# Patient Record
Sex: MALE | Race: BLACK OR AFRICAN AMERICAN | NOT HISPANIC OR LATINO | Employment: FULL TIME | URBAN - METROPOLITAN AREA
[De-identification: names, ages, dates, MRNs, and addresses within clinical notes are randomized per-mention and may not be internally consistent; named-entity substitution may affect disease eponyms.]

---

## 2019-02-13 ENCOUNTER — HOSPITAL ENCOUNTER (EMERGENCY)
Facility: HOSPITAL | Age: 39
Discharge: HOME | End: 2019-02-13
Attending: EMERGENCY MEDICINE
Payer: COMMERCIAL

## 2019-02-13 VITALS
TEMPERATURE: 98 F | SYSTOLIC BLOOD PRESSURE: 138 MMHG | WEIGHT: 290 LBS | RESPIRATION RATE: 18 BRPM | HEART RATE: 91 BPM | OXYGEN SATURATION: 100 % | DIASTOLIC BLOOD PRESSURE: 94 MMHG | HEIGHT: 78 IN | BODY MASS INDEX: 33.55 KG/M2

## 2019-02-13 DIAGNOSIS — Z02.89 ENCOUNTER TO OBTAIN EXCUSE FROM WORK: Primary | ICD-10-CM

## 2019-02-13 DIAGNOSIS — H57.89 IRRITATION OF RIGHT EYE: ICD-10-CM

## 2019-02-13 PROCEDURE — 99281 EMR DPT VST MAYX REQ PHY/QHP: CPT

## 2019-02-13 NOTE — ED PROVIDER NOTES
"Encounter Date: 2/13/2019       History     Chief Complaint   Patient presents with    Eye Problem     37yo male presents to ED for a work excuse to return to work stating "no pink eye." Reports he was seen yesterday at urgent care for right eye irritation.            Review of patient's allergies indicates:  No Known Allergies  History reviewed. No pertinent past medical history.  No past surgical history on file.  History reviewed. No pertinent family history.  Social History     Tobacco Use    Smoking status: Not on file   Substance Use Topics    Alcohol use: Not on file    Drug use: Not on file     Review of Systems   Constitutional: Negative for chills, diaphoresis, fatigue and fever.   HENT: Negative for congestion, ear pain, rhinorrhea, sinus pressure, sinus pain, sneezing, sore throat and tinnitus.    Eyes: Positive for redness. Negative for photophobia, pain, discharge, itching and visual disturbance.   Respiratory: Negative for cough, choking, chest tightness, shortness of breath, wheezing and stridor.    Cardiovascular: Negative for chest pain, palpitations and leg swelling.   Gastrointestinal: Negative for abdominal pain, constipation, diarrhea, nausea and vomiting.   Genitourinary: Negative for decreased urine volume, difficulty urinating, dysuria, flank pain, frequency and urgency.   Musculoskeletal: Negative for arthralgias, back pain, gait problem, joint swelling, myalgias, neck pain and neck stiffness.   Skin: Negative for color change, pallor, rash and wound.   Neurological: Negative for dizziness, seizures, syncope, weakness, light-headedness, numbness and headaches.   Hematological: Negative for adenopathy. Does not bruise/bleed easily.   All other systems reviewed and are negative.      Physical Exam     Initial Vitals [02/13/19 0910]   BP Pulse Resp Temp SpO2   (!) 138/94 91 18 98.4 °F (36.9 °C) 100 %      MAP       --         Physical Exam    Nursing note and vitals " reviewed.  Constitutional: He appears well-developed and well-nourished. He is not diaphoretic. No distress.   HENT:   Head: Normocephalic and atraumatic.   Right Ear: External ear normal.   Left Ear: External ear normal.   Nose: Nose normal.   Mouth/Throat: Oropharynx is clear and moist.   Eyes: Conjunctivae and EOM are normal. Pupils are equal, round, and reactive to light. Right eye exhibits no discharge. Left eye exhibits no discharge. No scleral icterus.   Neck: Normal range of motion. Neck supple.   Cardiovascular: Normal rate, regular rhythm, normal heart sounds and intact distal pulses.   No murmur heard.  Pulmonary/Chest: Breath sounds normal. No respiratory distress. He has no wheezes. He has no rhonchi. He exhibits no tenderness.   Abdominal: Soft. Bowel sounds are normal. He exhibits no distension. There is no tenderness.   Musculoskeletal: Normal range of motion. He exhibits no edema or tenderness.   Lymphadenopathy:     He has no cervical adenopathy.   Neurological: He is alert and oriented to person, place, and time. GCS score is 15. GCS eye subscore is 4. GCS verbal subscore is 5. GCS motor subscore is 6.   Skin: Skin is warm. Capillary refill takes less than 2 seconds. No rash noted. No erythema.   Psychiatric: He has a normal mood and affect. His behavior is normal. Judgment and thought content normal.         ED Course   Procedures  Labs Reviewed - No data to display       Imaging Results    None          Medical Decision Making:   Differential Diagnosis:   Encounter for work clearance                      Clinical Impression:   The primary encounter diagnosis was Encounter to obtain excuse from work. A diagnosis of Irritation of right eye was also pertinent to this visit.      Disposition:   Disposition: Discharged  Condition: Stable                        Jacqueline Mcduffie MD  02/13/19 6097

## 2019-02-13 NOTE — ED NOTES
Pt here for work excuse d/t eye redness yesterday. Pt states he was told he needed an excuse to return to work. No redness noted to eye. Denies drainage. No distress noted. Pt AAO

## 2020-01-11 ENCOUNTER — HOSPITAL ENCOUNTER (EMERGENCY)
Facility: HOSPITAL | Age: 40
Discharge: HOME | End: 2020-01-11
Attending: FAMILY MEDICINE

## 2020-01-11 VITALS
HEART RATE: 76 BPM | HEIGHT: 78 IN | DIASTOLIC BLOOD PRESSURE: 88 MMHG | RESPIRATION RATE: 16 BRPM | TEMPERATURE: 98 F | SYSTOLIC BLOOD PRESSURE: 126 MMHG | WEIGHT: 314 LBS | BODY MASS INDEX: 36.33 KG/M2 | OXYGEN SATURATION: 100 %

## 2020-01-11 DIAGNOSIS — H11.421 CHEMOSIS OF CONJUNCTIVA, RIGHT: Primary | ICD-10-CM

## 2020-01-11 PROCEDURE — 25000003 PHARM REV CODE 250: Performed by: PHYSICIAN ASSISTANT

## 2020-01-11 PROCEDURE — 99283 EMERGENCY DEPT VISIT LOW MDM: CPT

## 2020-01-11 RX ORDER — TETRACAINE HYDROCHLORIDE 5 MG/ML
2 SOLUTION OPHTHALMIC
Status: DISCONTINUED | OUTPATIENT
Start: 2020-01-11 | End: 2020-01-11

## 2020-01-11 RX ORDER — TETRACAINE HYDROCHLORIDE 5 MG/ML
2 SOLUTION OPHTHALMIC ONCE
Status: COMPLETED | OUTPATIENT
Start: 2020-01-11 | End: 2020-01-11

## 2020-01-11 RX ADMIN — TETRACAINE HYDROCHLORIDE 2 DROP: 5 SOLUTION OPHTHALMIC at 02:01

## 2020-01-11 RX ADMIN — FLUORESCEIN SODIUM 1 EACH: 1 STRIP OPHTHALMIC at 02:01

## 2020-01-11 NOTE — ED PROVIDER NOTES
Encounter Date: 1/11/2020       History     Chief Complaint   Patient presents with    Eye Pain     Patient presents to the ER with complaint of swelling to right eye.  Patient states felt like there is a foreign object in his eye was rubbing it and applied clear eye patient stated after applying clear I noticed swelling to the white part of his right eye.  Patient states has mild pain feels scratchy nonradiating nothing makes it better nothing makes it worse denies ever having this problem in the past.  Patient denies any change in vision nausea vomiting diarrhea runny nose or stuffy nose. Patient denied other associated symptoms.    The history is provided by the patient.     Review of patient's allergies indicates:  No Known Allergies  History reviewed. No pertinent past medical history.  History reviewed. No pertinent surgical history.  History reviewed. No pertinent family history.  Social History     Tobacco Use    Smoking status: Never Smoker   Substance Use Topics    Alcohol use: Not Currently    Drug use: Never     Review of Systems   Constitutional: Negative for fever.   HENT: Negative for congestion, ear discharge, ear pain, facial swelling, postnasal drip, rhinorrhea and sore throat.    Eyes: Positive for pain (Right) and redness ( right eye). Negative for photophobia ( ), discharge, itching and visual disturbance.   Respiratory: Negative for shortness of breath.    Cardiovascular: Negative for chest pain.   Gastrointestinal: Negative for nausea.   Genitourinary: Negative for dysuria.   Musculoskeletal: Negative for back pain.   Skin: Negative for rash.   Neurological: Negative for weakness.   Hematological: Does not bruise/bleed easily.   All other systems reviewed and are negative.      Physical Exam     Initial Vitals [01/11/20 1418]   BP Pulse Resp Temp SpO2   126/88 76 16 98 °F (36.7 °C) 100 %      MAP       --         Physical Exam    Nursing note and vitals reviewed.  Constitutional: He  appears well-developed and well-nourished. He is not diaphoretic. No distress.   HENT:   Head: Atraumatic.   Right Ear: External ear normal.   Left Ear: External ear normal.   Nose: Nose normal.   Mouth/Throat: Oropharynx is clear and moist. No oropharyngeal exudate.   Eyes: EOM and lids are normal. Pupils are equal, round, and reactive to light. Lids are everted and swept, no foreign bodies found. Right eye exhibits chemosis. Right eye exhibits no discharge, no exudate and no hordeolum. No foreign body present in the right eye. Left eye exhibits no chemosis, no discharge, no exudate and no hordeolum. No foreign body present in the left eye. Right conjunctiva is injected. Right conjunctiva has no hemorrhage. Left conjunctiva is not injected. Left conjunctiva has no hemorrhage. No scleral icterus. Right eye exhibits normal extraocular motion and no nystagmus. Left eye exhibits normal extraocular motion and no nystagmus.   Slit lamp exam:       The right eye shows no corneal abrasion, no corneal flare, no corneal ulcer, no foreign body, no hyphema, no hypopyon, no fluorescein uptake and no anterior chamber bulge.        The left eye shows no corneal abrasion, no corneal flare, no corneal ulcer, no foreign body, no hyphema, no hypopyon, no fluorescein uptake and no anterior chamber bulge.   Chemosis to right lateral conjunctiva no subconjunctival hemorrhage   Neck: Normal range of motion. Neck supple.   Cardiovascular: Normal rate, regular rhythm and intact distal pulses.   Pulmonary/Chest: No respiratory distress.   Musculoskeletal: Normal range of motion.   Neurological: He is alert and oriented to person, place, and time. GCS score is 15. GCS eye subscore is 4. GCS verbal subscore is 5. GCS motor subscore is 6.   Skin: Skin is warm and dry. Capillary refill takes less than 2 seconds.   Psychiatric: He has a normal mood and affect. Thought content normal.         ED Course   Procedures  Labs Reviewed - No data to  display       Imaging Results    None          Medical Decision Making:   Differential Diagnosis:   Conjunctival foreign body conjunctivitis chemosis corneal abrasion  ED Management:  Discussed with the patient plan of care will form slit-lamp exam as well as stain for possible corneal abrasion patient verbalized that he understood needed not have any questions.    Discussed with the patient return to ER precautions as well as need for follow-up with Ophthalmology patient verbalized E understood did not have any questions.    This note was created using [employer] voice recognition software that occasionally misinterpreted phrases or words.                                 Clinical Impression:       ICD-10-CM ICD-9-CM   1. Chemosis of conjunctiva, right H11.421 372.73                             DAISHA Sawyer  01/11/20 1454

## 2020-01-11 NOTE — DISCHARGE INSTRUCTIONS
Your blood pressure was elevated on exam today please follow up with pcp for blood pressure management.     Follow-up with ophthalmologist in 48 hr for re-evaluation.    If acute worsening of symptoms or change in vision return to ER for re-evaluation.

## 2021-08-11 ENCOUNTER — PATIENT MESSAGE (OUTPATIENT)
Dept: PODIATRY | Facility: CLINIC | Age: 41
End: 2021-08-11

## 2021-08-11 ENCOUNTER — OFFICE VISIT (OUTPATIENT)
Dept: PODIATRY | Facility: CLINIC | Age: 41
End: 2021-08-11
Payer: OTHER MISCELLANEOUS

## 2021-08-11 ENCOUNTER — HOSPITAL ENCOUNTER (OUTPATIENT)
Dept: RADIOLOGY | Facility: CLINIC | Age: 41
Discharge: HOME | End: 2021-08-11
Attending: PODIATRIST

## 2021-08-11 VITALS — WEIGHT: 315 LBS | HEIGHT: 78 IN | BODY MASS INDEX: 36.45 KG/M2

## 2021-08-11 DIAGNOSIS — S97.82XA CRUSHING INJURY OF LEFT FOOT, INITIAL ENCOUNTER: ICD-10-CM

## 2021-08-11 DIAGNOSIS — M79.672 LEFT FOOT PAIN: ICD-10-CM

## 2021-08-11 DIAGNOSIS — D36.10 NEUROMA: ICD-10-CM

## 2021-08-11 DIAGNOSIS — S90.32XA CONTUSION OF LEFT FOOT, INITIAL ENCOUNTER: Primary | ICD-10-CM

## 2021-08-11 PROCEDURE — 99204 OFFICE O/P NEW MOD 45 MIN: CPT | Mod: S$GLB,,, | Performed by: PODIATRIST

## 2021-08-11 PROCEDURE — 73630 X-RAY EXAM OF FOOT: CPT | Mod: LT,S$GLB,, | Performed by: RADIOLOGY

## 2021-08-11 PROCEDURE — 73630 XR FOOT COMPLETE 3 VIEW LEFT: ICD-10-PCS | Mod: LT,S$GLB,, | Performed by: RADIOLOGY

## 2021-08-11 PROCEDURE — 99204 PR OFFICE/OUTPT VISIT, NEW, LEVL IV, 45-59 MIN: ICD-10-PCS | Mod: S$GLB,,, | Performed by: PODIATRIST

## 2021-08-11 RX ORDER — OXYCODONE AND ACETAMINOPHEN 5; 325 MG/1; MG/1
1 TABLET ORAL EVERY 6 HOURS
COMMUNITY
Start: 2021-07-01 | End: 2021-08-19

## 2021-08-11 RX ORDER — METHYLPREDNISOLONE 4 MG/1
TABLET ORAL
Qty: 1 PACKAGE | Refills: 0 | OUTPATIENT
Start: 2021-08-11

## 2021-08-11 RX ORDER — NAPROXEN 500 MG/1
500 TABLET ORAL 2 TIMES DAILY
COMMUNITY
Start: 2021-07-01 | End: 2021-09-13

## 2021-08-11 RX ORDER — METHYLPREDNISOLONE 4 MG/1
TABLET ORAL
Qty: 1 PACKAGE | Refills: 0 | Status: SHIPPED | OUTPATIENT
Start: 2021-08-11 | End: 2021-08-19

## 2021-08-11 RX ORDER — PREDNISONE 20 MG/1
50 TABLET ORAL DAILY
COMMUNITY
Start: 2021-07-01 | End: 2021-08-19

## 2021-08-12 ENCOUNTER — TELEPHONE (OUTPATIENT)
Dept: PODIATRY | Facility: CLINIC | Age: 41
End: 2021-08-12

## 2021-08-16 ENCOUNTER — TELEPHONE (OUTPATIENT)
Dept: PODIATRY | Facility: CLINIC | Age: 41
End: 2021-08-16

## 2021-08-17 ENCOUNTER — TELEPHONE (OUTPATIENT)
Dept: PODIATRY | Facility: CLINIC | Age: 41
End: 2021-08-17

## 2021-08-19 ENCOUNTER — OFFICE VISIT (OUTPATIENT)
Dept: PODIATRY | Facility: CLINIC | Age: 41
End: 2021-08-19
Payer: OTHER MISCELLANEOUS

## 2021-08-19 VITALS — WEIGHT: 315 LBS | BODY MASS INDEX: 36.45 KG/M2 | RESPIRATION RATE: 16 BRPM | HEIGHT: 78 IN

## 2021-08-19 DIAGNOSIS — M79.672 LEFT FOOT PAIN: ICD-10-CM

## 2021-08-19 DIAGNOSIS — S97.82XD CRUSHING INJURY OF LEFT FOOT, SUBSEQUENT ENCOUNTER: Primary | ICD-10-CM

## 2021-08-19 DIAGNOSIS — D36.10 NEUROMA: ICD-10-CM

## 2021-08-19 DIAGNOSIS — S90.32XD CONTUSION OF LEFT FOOT, SUBSEQUENT ENCOUNTER: ICD-10-CM

## 2021-08-19 PROCEDURE — 64455 NJX AA&/STRD PLTR COM DG NRV: CPT | Mod: LT,S$GLB,, | Performed by: PODIATRIST

## 2021-08-19 PROCEDURE — 99213 PR OFFICE/OUTPT VISIT, EST, LEVL III, 20-29 MIN: ICD-10-PCS | Mod: 25,S$GLB,, | Performed by: PODIATRIST

## 2021-08-19 PROCEDURE — 64455 PR INJECT ANES/STEROID PLANTAR COMMON DIGITAL NERVE: ICD-10-PCS | Mod: LT,S$GLB,, | Performed by: PODIATRIST

## 2021-08-19 PROCEDURE — 99213 OFFICE O/P EST LOW 20 MIN: CPT | Mod: 25,S$GLB,, | Performed by: PODIATRIST

## 2021-08-19 RX ORDER — BUPIVACAINE HYDROCHLORIDE 5 MG/ML
1.5 INJECTION, SOLUTION PERINEURAL
Status: COMPLETED | OUTPATIENT
Start: 2021-08-19 | End: 2021-08-19

## 2021-08-19 RX ORDER — METHYLPREDNISOLONE ACETATE 40 MG/ML
20 INJECTION, SUSPENSION INTRA-ARTICULAR; INTRALESIONAL; INTRAMUSCULAR; SOFT TISSUE
Status: COMPLETED | OUTPATIENT
Start: 2021-08-19 | End: 2021-08-19

## 2021-08-19 RX ORDER — DEXAMETHASONE SODIUM PHOSPHATE 4 MG/ML
4 INJECTION, SOLUTION INTRA-ARTICULAR; INTRALESIONAL; INTRAMUSCULAR; INTRAVENOUS; SOFT TISSUE
Status: COMPLETED | OUTPATIENT
Start: 2021-08-19 | End: 2021-08-19

## 2021-08-19 RX ADMIN — METHYLPREDNISOLONE ACETATE 20 MG: 40 INJECTION, SUSPENSION INTRA-ARTICULAR; INTRALESIONAL; INTRAMUSCULAR; SOFT TISSUE at 04:08

## 2021-08-19 RX ADMIN — BUPIVACAINE HYDROCHLORIDE 7.5 MG: 5 INJECTION, SOLUTION PERINEURAL at 04:08

## 2021-08-19 RX ADMIN — DEXAMETHASONE SODIUM PHOSPHATE 4 MG: 4 INJECTION, SOLUTION INTRA-ARTICULAR; INTRALESIONAL; INTRAMUSCULAR; INTRAVENOUS; SOFT TISSUE at 04:08

## 2021-08-23 ENCOUNTER — PATIENT MESSAGE (OUTPATIENT)
Dept: PODIATRY | Facility: CLINIC | Age: 41
End: 2021-08-23

## 2021-08-23 RX ORDER — HYDROCODONE BITARTRATE AND ACETAMINOPHEN 5; 325 MG/1; MG/1
1 TABLET ORAL EVERY 4 HOURS PRN
Qty: 28 TABLET | Refills: 0 | Status: SHIPPED | OUTPATIENT
Start: 2021-08-23 | End: 2021-09-07

## 2021-09-02 ENCOUNTER — PATIENT MESSAGE (OUTPATIENT)
Dept: PODIATRY | Facility: CLINIC | Age: 41
End: 2021-09-02

## 2021-09-03 ENCOUNTER — TELEPHONE (OUTPATIENT)
Dept: PODIATRY | Facility: CLINIC | Age: 41
End: 2021-09-03

## 2021-09-03 ENCOUNTER — PATIENT MESSAGE (OUTPATIENT)
Dept: PODIATRY | Facility: CLINIC | Age: 41
End: 2021-09-03

## 2021-09-03 DIAGNOSIS — S97.82XD CRUSHING INJURY OF LEFT FOOT, SUBSEQUENT ENCOUNTER: Primary | ICD-10-CM

## 2021-09-03 DIAGNOSIS — M79.672 LEFT FOOT PAIN: ICD-10-CM

## 2021-09-03 DIAGNOSIS — S90.32XD CONTUSION OF LEFT FOOT, SUBSEQUENT ENCOUNTER: ICD-10-CM

## 2021-09-03 RX ORDER — HYDROCODONE BITARTRATE AND ACETAMINOPHEN 7.5; 325 MG/1; MG/1
1 TABLET ORAL EVERY 6 HOURS PRN
Qty: 20 TABLET | Refills: 0 | Status: SHIPPED | OUTPATIENT
Start: 2021-09-03 | End: 2021-09-07

## 2021-09-07 RX ORDER — HYDROCODONE BITARTRATE AND ACETAMINOPHEN 7.5; 325 MG/1; MG/1
1 TABLET ORAL EVERY 6 HOURS PRN
Qty: 28 TABLET | Refills: 0 | Status: SHIPPED | OUTPATIENT
Start: 2021-09-07 | End: 2021-09-15 | Stop reason: SDUPTHER

## 2021-09-13 ENCOUNTER — TELEPHONE (OUTPATIENT)
Dept: PODIATRY | Facility: CLINIC | Age: 41
End: 2021-09-13

## 2021-09-13 ENCOUNTER — OFFICE VISIT (OUTPATIENT)
Dept: PODIATRY | Facility: CLINIC | Age: 41
End: 2021-09-13
Payer: OTHER MISCELLANEOUS

## 2021-09-13 VITALS — BODY MASS INDEX: 36.45 KG/M2 | WEIGHT: 315 LBS | HEIGHT: 78 IN | RESPIRATION RATE: 16 BRPM

## 2021-09-13 DIAGNOSIS — S97.82XD CRUSHING INJURY OF LEFT FOOT, SUBSEQUENT ENCOUNTER: Primary | ICD-10-CM

## 2021-09-13 DIAGNOSIS — S90.32XD CONTUSION OF LEFT FOOT, SUBSEQUENT ENCOUNTER: ICD-10-CM

## 2021-09-13 DIAGNOSIS — M79.672 LEFT FOOT PAIN: ICD-10-CM

## 2021-09-13 DIAGNOSIS — D36.10 NEUROMA: ICD-10-CM

## 2021-09-13 PROCEDURE — 99213 PR OFFICE/OUTPT VISIT, EST, LEVL III, 20-29 MIN: ICD-10-PCS | Mod: S$GLB,,, | Performed by: PODIATRIST

## 2021-09-13 PROCEDURE — 99213 OFFICE O/P EST LOW 20 MIN: CPT | Mod: S$GLB,,, | Performed by: PODIATRIST

## 2021-09-15 DIAGNOSIS — S97.82XD CRUSHING INJURY OF LEFT FOOT, SUBSEQUENT ENCOUNTER: ICD-10-CM

## 2021-09-15 RX ORDER — HYDROCODONE BITARTRATE AND ACETAMINOPHEN 7.5; 325 MG/1; MG/1
1 TABLET ORAL EVERY 6 HOURS PRN
Qty: 28 TABLET | Refills: 0 | Status: SHIPPED | OUTPATIENT
Start: 2021-09-15 | End: 2021-09-27

## 2021-09-17 ENCOUNTER — TELEPHONE (OUTPATIENT)
Dept: PODIATRY | Facility: CLINIC | Age: 41
End: 2021-09-17

## 2021-09-24 ENCOUNTER — PATIENT MESSAGE (OUTPATIENT)
Dept: PODIATRY | Facility: CLINIC | Age: 41
End: 2021-09-24

## 2021-09-27 ENCOUNTER — OFFICE VISIT (OUTPATIENT)
Dept: PODIATRY | Facility: CLINIC | Age: 41
End: 2021-09-27
Payer: OTHER MISCELLANEOUS

## 2021-09-27 VITALS — HEIGHT: 78 IN | WEIGHT: 315 LBS | BODY MASS INDEX: 36.45 KG/M2 | RESPIRATION RATE: 16 BRPM

## 2021-09-27 DIAGNOSIS — S97.82XD CRUSHING INJURY OF LEFT FOOT, SUBSEQUENT ENCOUNTER: Primary | ICD-10-CM

## 2021-09-27 DIAGNOSIS — S90.32XD CONTUSION OF LEFT FOOT, SUBSEQUENT ENCOUNTER: ICD-10-CM

## 2021-09-27 DIAGNOSIS — M79.672 LEFT FOOT PAIN: ICD-10-CM

## 2021-09-27 PROCEDURE — 99213 PR OFFICE/OUTPT VISIT, EST, LEVL III, 20-29 MIN: ICD-10-PCS | Mod: S$GLB,,, | Performed by: PODIATRIST

## 2021-09-27 PROCEDURE — 99213 OFFICE O/P EST LOW 20 MIN: CPT | Mod: S$GLB,,, | Performed by: PODIATRIST

## 2021-09-27 RX ORDER — METHYLPREDNISOLONE 4 MG/1
TABLET ORAL
Qty: 1 PACKAGE | Refills: 0 | Status: SHIPPED | OUTPATIENT
Start: 2021-09-27 | End: 2021-11-17

## 2021-09-27 RX ORDER — HYDROCODONE BITARTRATE AND ACETAMINOPHEN 10; 325 MG/1; MG/1
1 TABLET ORAL EVERY 6 HOURS PRN
Qty: 28 TABLET | Refills: 0 | Status: SHIPPED | OUTPATIENT
Start: 2021-09-27 | End: 2021-10-11 | Stop reason: SDUPTHER

## 2021-09-28 ENCOUNTER — TELEPHONE (OUTPATIENT)
Dept: PODIATRY | Facility: CLINIC | Age: 41
End: 2021-09-28

## 2021-10-11 ENCOUNTER — PATIENT MESSAGE (OUTPATIENT)
Dept: PODIATRY | Facility: CLINIC | Age: 41
End: 2021-10-11

## 2021-10-11 ENCOUNTER — HOSPITAL ENCOUNTER (OUTPATIENT)
Dept: RADIOLOGY | Facility: CLINIC | Age: 41
Discharge: HOME | End: 2021-10-11
Attending: PODIATRIST

## 2021-10-11 ENCOUNTER — OFFICE VISIT (OUTPATIENT)
Dept: PODIATRY | Facility: CLINIC | Age: 41
End: 2021-10-11
Payer: OTHER MISCELLANEOUS

## 2021-10-11 VITALS — WEIGHT: 315 LBS | OXYGEN SATURATION: 97 % | HEIGHT: 78 IN | HEART RATE: 79 BPM | BODY MASS INDEX: 36.45 KG/M2

## 2021-10-11 DIAGNOSIS — M79.672 LEFT FOOT PAIN: ICD-10-CM

## 2021-10-11 DIAGNOSIS — S97.82XD CRUSHING INJURY OF LEFT FOOT, SUBSEQUENT ENCOUNTER: Primary | ICD-10-CM

## 2021-10-11 DIAGNOSIS — S90.32XD CONTUSION OF LEFT FOOT, SUBSEQUENT ENCOUNTER: ICD-10-CM

## 2021-10-11 DIAGNOSIS — M77.52 CAPSULITIS OF METATARSOPHALANGEAL (MTP) JOINT OF LEFT FOOT: ICD-10-CM

## 2021-10-11 PROCEDURE — 99213 OFFICE O/P EST LOW 20 MIN: CPT | Mod: S$GLB,,, | Performed by: PODIATRIST

## 2021-10-11 PROCEDURE — 99213 PR OFFICE/OUTPT VISIT, EST, LEVL III, 20-29 MIN: ICD-10-PCS | Mod: S$GLB,,, | Performed by: PODIATRIST

## 2021-10-11 PROCEDURE — 73630 XR FOOT COMPLETE 3 VIEW LEFT: ICD-10-PCS | Mod: LT,S$GLB,, | Performed by: RADIOLOGY

## 2021-10-11 PROCEDURE — 73630 X-RAY EXAM OF FOOT: CPT | Mod: LT,S$GLB,, | Performed by: RADIOLOGY

## 2021-10-11 RX ORDER — HYDROCODONE BITARTRATE AND ACETAMINOPHEN 10; 325 MG/1; MG/1
1 TABLET ORAL EVERY 6 HOURS PRN
Qty: 28 TABLET | Refills: 0 | Status: SHIPPED | OUTPATIENT
Start: 2021-10-11 | End: 2021-10-22 | Stop reason: SDUPTHER

## 2021-10-22 ENCOUNTER — HOSPITAL ENCOUNTER (OUTPATIENT)
Dept: RADIOLOGY | Facility: HOSPITAL | Age: 41
Discharge: HOME | End: 2021-10-22
Attending: PODIATRIST
Payer: OTHER MISCELLANEOUS

## 2021-10-22 DIAGNOSIS — M77.52 CAPSULITIS OF METATARSOPHALANGEAL (MTP) JOINT OF LEFT FOOT: ICD-10-CM

## 2021-10-22 DIAGNOSIS — S97.82XD CRUSHING INJURY OF LEFT FOOT, SUBSEQUENT ENCOUNTER: ICD-10-CM

## 2021-10-22 DIAGNOSIS — M79.672 LEFT FOOT PAIN: ICD-10-CM

## 2021-10-22 DIAGNOSIS — S90.32XD CONTUSION OF LEFT FOOT, SUBSEQUENT ENCOUNTER: ICD-10-CM

## 2021-10-22 PROCEDURE — 73718 MRI LOWER EXTREMITY W/O DYE: CPT | Mod: TC,PO,LT

## 2021-10-25 RX ORDER — HYDROCODONE BITARTRATE AND ACETAMINOPHEN 10; 325 MG/1; MG/1
1 TABLET ORAL EVERY 6 HOURS PRN
Qty: 28 TABLET | Refills: 0 | Status: SHIPPED | OUTPATIENT
Start: 2021-10-25 | End: 2021-11-15 | Stop reason: SDUPTHER

## 2021-10-28 ENCOUNTER — OFFICE VISIT (OUTPATIENT)
Dept: PODIATRY | Facility: CLINIC | Age: 41
End: 2021-10-28
Payer: OTHER MISCELLANEOUS

## 2021-10-28 VITALS — BODY MASS INDEX: 36.45 KG/M2 | WEIGHT: 315 LBS | HEIGHT: 78 IN

## 2021-10-28 DIAGNOSIS — M79.672 LEFT FOOT PAIN: ICD-10-CM

## 2021-10-28 DIAGNOSIS — M77.52 CAPSULITIS OF METATARSOPHALANGEAL (MTP) JOINT OF LEFT FOOT: ICD-10-CM

## 2021-10-28 DIAGNOSIS — S90.32XD CONTUSION OF LEFT FOOT, SUBSEQUENT ENCOUNTER: ICD-10-CM

## 2021-10-28 DIAGNOSIS — S97.82XD CRUSHING INJURY OF LEFT FOOT, SUBSEQUENT ENCOUNTER: Primary | ICD-10-CM

## 2021-10-28 PROCEDURE — 99213 OFFICE O/P EST LOW 20 MIN: CPT | Mod: S$GLB,,, | Performed by: PODIATRIST

## 2021-10-28 PROCEDURE — 99213 PR OFFICE/OUTPT VISIT, EST, LEVL III, 20-29 MIN: ICD-10-PCS | Mod: S$GLB,,, | Performed by: PODIATRIST

## 2021-11-15 ENCOUNTER — PATIENT MESSAGE (OUTPATIENT)
Dept: PODIATRY | Facility: CLINIC | Age: 41
End: 2021-11-15
Payer: OTHER MISCELLANEOUS

## 2021-11-15 DIAGNOSIS — S97.82XD CRUSHING INJURY OF LEFT FOOT, SUBSEQUENT ENCOUNTER: ICD-10-CM

## 2021-11-15 DIAGNOSIS — M79.672 LEFT FOOT PAIN: ICD-10-CM

## 2021-11-15 DIAGNOSIS — S90.32XD CONTUSION OF LEFT FOOT, SUBSEQUENT ENCOUNTER: ICD-10-CM

## 2021-11-15 RX ORDER — HYDROCODONE BITARTRATE AND ACETAMINOPHEN 10; 325 MG/1; MG/1
1 TABLET ORAL EVERY 6 HOURS PRN
Qty: 28 TABLET | Refills: 0 | Status: ON HOLD | OUTPATIENT
Start: 2021-11-15 | End: 2021-12-30 | Stop reason: HOSPADM

## 2021-11-16 ENCOUNTER — TELEPHONE (OUTPATIENT)
Dept: PODIATRY | Facility: CLINIC | Age: 41
End: 2021-11-16
Payer: OTHER MISCELLANEOUS

## 2021-11-17 ENCOUNTER — OFFICE VISIT (OUTPATIENT)
Dept: PODIATRY | Facility: CLINIC | Age: 41
End: 2021-11-17
Payer: OTHER MISCELLANEOUS

## 2021-11-17 VITALS — BODY MASS INDEX: 36.45 KG/M2 | WEIGHT: 315 LBS | RESPIRATION RATE: 16 BRPM | HEIGHT: 78 IN

## 2021-11-17 DIAGNOSIS — M79.672 LEFT FOOT PAIN: ICD-10-CM

## 2021-11-17 DIAGNOSIS — M76.72 PERONEAL TENDINITIS OF LEFT LOWER EXTREMITY: ICD-10-CM

## 2021-11-17 DIAGNOSIS — S97.82XD CRUSHING INJURY OF LEFT FOOT, SUBSEQUENT ENCOUNTER: Primary | ICD-10-CM

## 2021-11-17 DIAGNOSIS — D36.10 NEUROMA: ICD-10-CM

## 2021-11-17 PROCEDURE — 99214 PR OFFICE/OUTPT VISIT, EST, LEVL IV, 30-39 MIN: ICD-10-PCS | Mod: S$GLB,,, | Performed by: PODIATRIST

## 2021-11-17 PROCEDURE — 99214 OFFICE O/P EST MOD 30 MIN: CPT | Mod: S$GLB,,, | Performed by: PODIATRIST

## 2021-11-17 RX ORDER — METHYLPREDNISOLONE 4 MG/1
TABLET ORAL
Qty: 1 EACH | Refills: 0 | Status: SHIPPED | OUTPATIENT
Start: 2021-11-17 | End: 2021-12-28 | Stop reason: CLARIF

## 2021-11-22 ENCOUNTER — PATIENT MESSAGE (OUTPATIENT)
Dept: PODIATRY | Facility: CLINIC | Age: 41
End: 2021-11-22
Payer: OTHER MISCELLANEOUS

## 2021-11-23 ENCOUNTER — OFFICE VISIT (OUTPATIENT)
Dept: PODIATRY | Facility: CLINIC | Age: 41
End: 2021-11-23
Payer: OTHER MISCELLANEOUS

## 2021-11-23 VITALS — HEIGHT: 78 IN | WEIGHT: 315 LBS | BODY MASS INDEX: 36.45 KG/M2 | HEART RATE: 58 BPM | OXYGEN SATURATION: 98 %

## 2021-11-23 DIAGNOSIS — D36.10 NEUROMA: Primary | ICD-10-CM

## 2021-11-23 DIAGNOSIS — M76.72 PERONEAL TENDINITIS OF LEFT LOWER EXTREMITY: ICD-10-CM

## 2021-11-23 DIAGNOSIS — M79.672 LEFT FOOT PAIN: ICD-10-CM

## 2021-11-23 DIAGNOSIS — S90.32XD CONTUSION OF LEFT FOOT, SUBSEQUENT ENCOUNTER: ICD-10-CM

## 2021-11-23 DIAGNOSIS — S97.82XD CRUSHING INJURY OF LEFT FOOT, SUBSEQUENT ENCOUNTER: ICD-10-CM

## 2021-11-23 PROCEDURE — 64455 PR INJECT ANES/STEROID PLANTAR COMMON DIGITAL NERVE: ICD-10-PCS | Mod: LT,S$GLB,, | Performed by: PODIATRIST

## 2021-11-23 PROCEDURE — 99213 OFFICE O/P EST LOW 20 MIN: CPT | Mod: 25,S$GLB,, | Performed by: PODIATRIST

## 2021-11-23 PROCEDURE — 64455 NJX AA&/STRD PLTR COM DG NRV: CPT | Mod: LT,S$GLB,, | Performed by: PODIATRIST

## 2021-11-23 PROCEDURE — 99213 PR OFFICE/OUTPT VISIT, EST, LEVL III, 20-29 MIN: ICD-10-PCS | Mod: 25,S$GLB,, | Performed by: PODIATRIST

## 2021-11-23 RX ORDER — DEXAMETHASONE SODIUM PHOSPHATE 4 MG/ML
4 INJECTION, SOLUTION INTRA-ARTICULAR; INTRALESIONAL; INTRAMUSCULAR; INTRAVENOUS; SOFT TISSUE
Status: COMPLETED | OUTPATIENT
Start: 2021-11-23 | End: 2021-11-23

## 2021-11-23 RX ORDER — BUPIVACAINE HYDROCHLORIDE 5 MG/ML
1 INJECTION, SOLUTION PERINEURAL
Status: COMPLETED | OUTPATIENT
Start: 2021-11-23 | End: 2021-11-23

## 2021-11-23 RX ADMIN — BUPIVACAINE HYDROCHLORIDE 5 MG: 5 INJECTION, SOLUTION PERINEURAL at 02:11

## 2021-11-23 RX ADMIN — DEXAMETHASONE SODIUM PHOSPHATE 4 MG: 4 INJECTION, SOLUTION INTRA-ARTICULAR; INTRALESIONAL; INTRAMUSCULAR; INTRAVENOUS; SOFT TISSUE at 02:11

## 2021-11-29 ENCOUNTER — PATIENT MESSAGE (OUTPATIENT)
Dept: PODIATRY | Facility: CLINIC | Age: 41
End: 2021-11-29
Payer: OTHER MISCELLANEOUS

## 2021-11-30 ENCOUNTER — PATIENT MESSAGE (OUTPATIENT)
Dept: PODIATRY | Facility: CLINIC | Age: 41
End: 2021-11-30
Payer: OTHER MISCELLANEOUS

## 2021-12-06 ENCOUNTER — OFFICE VISIT (OUTPATIENT)
Dept: PODIATRY | Facility: CLINIC | Age: 41
End: 2021-12-06
Payer: OTHER MISCELLANEOUS

## 2021-12-06 ENCOUNTER — PATIENT MESSAGE (OUTPATIENT)
Dept: PODIATRY | Facility: CLINIC | Age: 41
End: 2021-12-06

## 2021-12-06 ENCOUNTER — TELEPHONE (OUTPATIENT)
Dept: PODIATRY | Facility: CLINIC | Age: 41
End: 2021-12-06

## 2021-12-06 VITALS
HEART RATE: 64 BPM | WEIGHT: 315 LBS | RESPIRATION RATE: 18 BRPM | SYSTOLIC BLOOD PRESSURE: 124 MMHG | HEIGHT: 78 IN | DIASTOLIC BLOOD PRESSURE: 84 MMHG | OXYGEN SATURATION: 99 % | BODY MASS INDEX: 36.45 KG/M2

## 2021-12-06 DIAGNOSIS — D36.10 NEUROMA: Primary | ICD-10-CM

## 2021-12-06 DIAGNOSIS — M79.672 LEFT FOOT PAIN: ICD-10-CM

## 2021-12-06 DIAGNOSIS — S97.82XD CRUSHING INJURY OF LEFT FOOT, SUBSEQUENT ENCOUNTER: ICD-10-CM

## 2021-12-06 DIAGNOSIS — S90.32XD CONTUSION OF LEFT FOOT, SUBSEQUENT ENCOUNTER: ICD-10-CM

## 2021-12-06 PROCEDURE — 99214 PR OFFICE/OUTPT VISIT, EST, LEVL IV, 30-39 MIN: ICD-10-PCS | Mod: S$GLB,,, | Performed by: PODIATRIST

## 2021-12-06 PROCEDURE — 99214 OFFICE O/P EST MOD 30 MIN: CPT | Mod: S$GLB,,, | Performed by: PODIATRIST

## 2021-12-06 RX ORDER — SODIUM CHLORIDE 0.9 % (FLUSH) 0.9 %
SYRINGE (ML) INJECTION
Status: CANCELLED | OUTPATIENT
Start: 2021-12-06

## 2021-12-06 NOTE — H&P (VIEW-ONLY)
"  [address] [facility] [address] [place] [address]  Phone: [phone]   [phone]    Patient's PCP:Primary Doctor No  Referring Provider: No ref. provider found    Subjective:      Chief Complaint:: Follow-up (/Crushing injury of left foot and neuroma)    HPI  Artis Corey is a 41 y.o. male who presents for follow-up on left foot crush injury and neuroma. Treatment tried boot cast, ace wrap, physical therapy, ice, medrol dose pack, Ibuprofen and cortisone injection. Complaint of continued pain since last visit.  He states the shot helped however only for a few days.  Interested in discussing surgical options    Vitals:    12/06/21 1125   BP: 124/84   Pulse: 64   Resp: 18   SpO2: 99%   Weight: (!) 144.2 kg (318 lb)   Height: 6' 6" (1.981 m)   PainSc:   8      Shoe Size: 13    History reviewed. No pertinent surgical history.  History reviewed. No pertinent past medical history.  History reviewed. No pertinent family history.     Social History:   Marital Status:   Alcohol History:  reports previous alcohol use.  Tobacco History:  reports that he has never smoked. He has never used smokeless tobacco.  Drug History:  reports no history of drug use.    Review of patient's allergies indicates:  No Known Allergies    Current Outpatient Medications   Medication Sig Dispense Refill    HYDROcodone-acetaminophen (NORCO)  mg per tablet Take 1 tablet by mouth every 6 (six) hours as needed for Pain. 28 tablet 0    methylPREDNISolone (MEDROL DOSEPACK) 4 mg tablet use as directed (Patient not taking: Reported on 11/23/2021) 1 each 0     No current facility-administered medications for this visit.       Review of Systems   Constitutional: Negative for chills, fatigue, fever and unexpected weight change.   HENT: Negative for hearing loss and trouble swallowing.    Eyes: Negative for photophobia and visual disturbance.   Respiratory: Negative for cough, shortness of breath and wheezing.    Cardiovascular: " Negative for chest pain, palpitations and leg swelling.   Gastrointestinal: Negative for abdominal pain and nausea.   Genitourinary: Negative for dysuria and frequency.   Musculoskeletal: Positive for gait problem and joint swelling. Negative for arthralgias, back pain and myalgias.   Skin: Negative for rash and wound.   Neurological: Negative for tremors, seizures, weakness, numbness and headaches.   Hematological: Does not bruise/bleed easily.         Objective:        Physical Exam:   Foot Exam    General  General Appearance: appears stated age and healthy   Orientation: alert and oriented to person, place, and time   Affect: appropriate   Gait: antalgic       Left Foot/Ankle      Inspection and Palpation  Ecchymosis: none  Tenderness: lesser metatarsophalangeal joints (Second intermetatarsal space )  Swelling: none   Arch: normal  Hammertoes: absent  Skin Exam: skin intact; no ulcer and no erythema   Neurovascular  Dorsalis pedis: 2+  Posterior tibial: 2+  Capillary refill: 2+  Varicose veins: not present  Saphenous nerve sensation: normal  Tibial nerve sensation: normal  Superficial peroneal nerve sensation: normal  Deep peroneal nerve sensation: normal  Sural nerve sensation: normal    Edema  Type of edema: non-pitting    Muscle Strength  Ankle dorsiflexion: 5  Ankle plantar flexion: 5  Ankle inversion: 5  Ankle eversion: 5  Great toe extension: 5  Great toe flexion: 5    Range of Motion    Normal left ankle ROM  Passive  Ankle eversion: pain  Ankle inversion: pain      Tests  Anterior drawer: negative   Talar tilt: negative   PT Tinel's sign: negative  Paresthesia: positive(Second intermetatarsal space)        Physical Exam  Cardiovascular:      Pulses:           Dorsalis pedis pulses are 2+ on the left side.        Posterior tibial pulses are 2+ on the left side.   Feet:      Left foot:      Skin integrity: No ulcer or erythema.         Imaging:  None           Assessment:       1. Neuroma    2. Crushing  injury of left foot, subsequent encounter    3. Contusion of left foot, subsequent encounter    4. Left foot pain      Plan:   Neuroma    Crushing injury of left foot, subsequent encounter    Contusion of left foot, subsequent encounter    Left foot pain      Follow up Patient will be scheduled for outpatient surgery.    Procedures        I discussed treatment options going forward with the patient.  We discussed the option of continued conservative treatment with immobilization Cam Walker boot verses surgical excision of the neuroma in the 2nd intermetatarsal space of the left foot.  Risk and benefits discussed the patient and patient has elected to proceed with surgical intervention.    Patient was educated about the entire pre, vaibhav and post-operative time period.  They  were educated about the pro's and con's of surgery.  All conservative measures have been exhausted. Patient understands the particular risks involved which may occur in connection with the procedure proposed including pain, swelling, infection, stiffness, decreased ROM, recurrence, rejection, numbness, delayed healing, scar formation.    Patient was educated that their diagnosis may be surgically treated.  The patient's problem will probably advance and usually will not get better without surgery.  All of the pre-operative treatment plans have been exhausted or will no longer be successful at this point in time.  Patient was told of the possible outcomes and expectations of the surgical procedure.  They  will need to be followed-up post-operatively.  Today, pictures were drawn, questions answered.      We discussed the following surgical procedures:  Excision of neuroma 2nd intermetatarsal space left foot       Counseling:     I provided patient education verbally regarding:   Patient diagnosis, treatment options, as well as alternatives, risks, and benefits.     I counseled patient on causes and treatments for neuromas. Wearing tight or  high-heeled shoes can cause a neuroma. Shoes that are too narrow or too pointed squeeze the bones in the ball of the foot. Shoes with high heels put extra pressure on the ends of the bones. When the bones are squeezed together, they pinch the nerve that runs between them. Taking off your shoes and rubbing the ball of your foot may decrease or relieve the pain. Recommend shoes with more room in the toe box. Sometimes steroid injection is necessary to alleviate symptoms. Discussed alcohol sclerosing injections as another option for conservative treatment.       This note was created using Dragon voice recognition software that occasionally misinterpreted phrases or words.

## 2021-12-07 ENCOUNTER — PATIENT MESSAGE (OUTPATIENT)
Dept: SURGERY | Facility: HOSPITAL | Age: 41
End: 2021-12-07
Payer: OTHER MISCELLANEOUS

## 2021-12-07 DIAGNOSIS — D36.10 NEUROMA: Primary | ICD-10-CM

## 2021-12-08 RX ORDER — HYDROCODONE BITARTRATE AND ACETAMINOPHEN 10; 325 MG/1; MG/1
1 TABLET ORAL EVERY 6 HOURS PRN
Qty: 28 TABLET | Refills: 0 | Status: ON HOLD | OUTPATIENT
Start: 2021-12-08 | End: 2021-12-30 | Stop reason: HOSPADM

## 2021-12-13 ENCOUNTER — PATIENT MESSAGE (OUTPATIENT)
Dept: SURGERY | Facility: HOSPITAL | Age: 41
End: 2021-12-13
Payer: OTHER MISCELLANEOUS

## 2021-12-14 ENCOUNTER — PATIENT MESSAGE (OUTPATIENT)
Dept: SURGERY | Facility: HOSPITAL | Age: 41
End: 2021-12-14
Payer: OTHER MISCELLANEOUS

## 2021-12-14 ENCOUNTER — PATIENT MESSAGE (OUTPATIENT)
Dept: PODIATRY | Facility: CLINIC | Age: 41
End: 2021-12-14
Payer: OTHER MISCELLANEOUS

## 2021-12-14 ENCOUNTER — TELEPHONE (OUTPATIENT)
Dept: PODIATRY | Facility: CLINIC | Age: 41
End: 2021-12-14
Payer: OTHER MISCELLANEOUS

## 2021-12-22 ENCOUNTER — TELEPHONE (OUTPATIENT)
Dept: PODIATRY | Facility: CLINIC | Age: 41
End: 2021-12-22
Payer: OTHER MISCELLANEOUS

## 2021-12-28 ENCOUNTER — HOSPITAL ENCOUNTER (OUTPATIENT)
Dept: RADIOLOGY | Facility: HOSPITAL | Age: 41
Discharge: HOME | End: 2021-12-28
Attending: PODIATRIST
Payer: OTHER MISCELLANEOUS

## 2021-12-28 ENCOUNTER — HOSPITAL ENCOUNTER (OUTPATIENT)
Dept: PREADMISSION TESTING | Facility: HOSPITAL | Age: 41
Discharge: HOME | End: 2021-12-28
Attending: PODIATRIST
Payer: OTHER MISCELLANEOUS

## 2021-12-28 VITALS
HEIGHT: 78 IN | OXYGEN SATURATION: 97 % | RESPIRATION RATE: 18 BRPM | WEIGHT: 315 LBS | SYSTOLIC BLOOD PRESSURE: 123 MMHG | DIASTOLIC BLOOD PRESSURE: 84 MMHG | HEART RATE: 72 BPM | BODY MASS INDEX: 36.45 KG/M2 | TEMPERATURE: 98 F

## 2021-12-28 DIAGNOSIS — Z01.818 PRE-OP TESTING: Primary | ICD-10-CM

## 2021-12-28 DIAGNOSIS — M79.672 LEFT FOOT PAIN: ICD-10-CM

## 2021-12-28 DIAGNOSIS — D36.10 NEUROMA: ICD-10-CM

## 2021-12-28 LAB
ALBUMIN SERPL BCP-MCNC: 4.7 G/DL (ref 3.5–5.2)
ALP SERPL-CCNC: 36 U/L (ref 55–135)
ALT SERPL W/O P-5'-P-CCNC: 52 U/L (ref 10–44)
ANION GAP SERPL CALC-SCNC: 8 MMOL/L (ref 8–16)
AST SERPL-CCNC: 26 U/L (ref 10–40)
BASOPHILS # BLD AUTO: 0.04 K/UL (ref 0–0.2)
BASOPHILS NFR BLD: 0.7 % (ref 0–1.9)
BILIRUB SERPL-MCNC: 0.8 MG/DL (ref 0.1–1)
BUN SERPL-MCNC: 15 MG/DL (ref 6–20)
CALCIUM SERPL-MCNC: 9.6 MG/DL (ref 8.7–10.5)
CHLORIDE SERPL-SCNC: 104 MMOL/L (ref 95–110)
CO2 SERPL-SCNC: 27 MMOL/L (ref 23–29)
CREAT SERPL-MCNC: 1.4 MG/DL (ref 0.5–1.4)
DIFFERENTIAL METHOD: NORMAL
EOSINOPHIL # BLD AUTO: 0.1 K/UL (ref 0–0.5)
EOSINOPHIL NFR BLD: 1.6 % (ref 0–8)
ERYTHROCYTE [DISTWIDTH] IN BLOOD BY AUTOMATED COUNT: 12.7 % (ref 11.5–14.5)
EST. GFR  (AFRICAN AMERICAN): >60 ML/MIN/1.73 M^2
EST. GFR  (NON AFRICAN AMERICAN): >60 ML/MIN/1.73 M^2
GLUCOSE SERPL-MCNC: 143 MG/DL (ref 70–110)
HCT VFR BLD AUTO: 46 % (ref 40–54)
HGB BLD-MCNC: 15 G/DL (ref 14–18)
IMM GRANULOCYTES # BLD AUTO: 0.01 K/UL (ref 0–0.04)
IMM GRANULOCYTES NFR BLD AUTO: 0.2 % (ref 0–0.5)
LYMPHOCYTES # BLD AUTO: 2.1 K/UL (ref 1–4.8)
LYMPHOCYTES NFR BLD: 35.7 % (ref 18–48)
MCH RBC QN AUTO: 28.7 PG (ref 27–31)
MCHC RBC AUTO-ENTMCNC: 32.6 G/DL (ref 32–36)
MCV RBC AUTO: 88 FL (ref 82–98)
MONOCYTES # BLD AUTO: 0.5 K/UL (ref 0.3–1)
MONOCYTES NFR BLD: 7.8 % (ref 4–15)
NEUTROPHILS # BLD AUTO: 3.1 K/UL (ref 1.8–7.7)
NEUTROPHILS NFR BLD: 54 % (ref 38–73)
NRBC BLD-RTO: 0 /100 WBC
PLATELET # BLD AUTO: 279 K/UL (ref 150–450)
PMV BLD AUTO: 10.4 FL (ref 9.2–12.9)
POTASSIUM SERPL-SCNC: 4.3 MMOL/L (ref 3.5–5.1)
PROT SERPL-MCNC: 8 G/DL (ref 6–8.4)
RBC # BLD AUTO: 5.22 M/UL (ref 4.6–6.2)
SARS-COV-2 RDRP RESP QL NAA+PROBE: NEGATIVE
SODIUM SERPL-SCNC: 139 MMOL/L (ref 136–145)
WBC # BLD AUTO: 5.75 K/UL (ref 3.9–12.7)

## 2021-12-28 PROCEDURE — 80053 COMPREHEN METABOLIC PANEL: CPT | Performed by: PODIATRIST

## 2021-12-28 PROCEDURE — 71046 X-RAY EXAM CHEST 2 VIEWS: CPT | Mod: TC

## 2021-12-28 PROCEDURE — 85025 COMPLETE CBC W/AUTO DIFF WBC: CPT | Performed by: PODIATRIST

## 2021-12-28 PROCEDURE — 93010 EKG 12-LEAD: ICD-10-PCS | Mod: ,,, | Performed by: SPECIALIST

## 2021-12-28 PROCEDURE — 36415 COLL VENOUS BLD VENIPUNCTURE: CPT | Performed by: PODIATRIST

## 2021-12-28 PROCEDURE — U0002 COVID-19 LAB TEST NON-CDC: HCPCS | Performed by: PODIATRIST

## 2021-12-28 PROCEDURE — 93005 ELECTROCARDIOGRAM TRACING: CPT | Performed by: SPECIALIST

## 2021-12-28 PROCEDURE — 93010 ELECTROCARDIOGRAM REPORT: CPT | Mod: ,,, | Performed by: SPECIALIST

## 2021-12-30 ENCOUNTER — ANESTHESIA (OUTPATIENT)
Dept: SURGERY | Facility: HOSPITAL | Age: 41
End: 2021-12-30
Payer: OTHER MISCELLANEOUS

## 2021-12-30 ENCOUNTER — ANESTHESIA EVENT (OUTPATIENT)
Dept: SURGERY | Facility: HOSPITAL | Age: 41
End: 2021-12-30
Payer: OTHER MISCELLANEOUS

## 2021-12-30 ENCOUNTER — HOSPITAL ENCOUNTER (OUTPATIENT)
Facility: HOSPITAL | Age: 41
Discharge: HOME | End: 2021-12-30
Attending: PODIATRIST | Admitting: PODIATRIST
Payer: OTHER MISCELLANEOUS

## 2021-12-30 VITALS
DIASTOLIC BLOOD PRESSURE: 91 MMHG | BODY MASS INDEX: 36.45 KG/M2 | OXYGEN SATURATION: 97 % | HEART RATE: 74 BPM | TEMPERATURE: 98 F | HEIGHT: 78 IN | RESPIRATION RATE: 16 BRPM | WEIGHT: 315 LBS | SYSTOLIC BLOOD PRESSURE: 129 MMHG

## 2021-12-30 DIAGNOSIS — Z01.818 PRE-OP TESTING: ICD-10-CM

## 2021-12-30 DIAGNOSIS — D36.10 NEUROMA: Primary | ICD-10-CM

## 2021-12-30 DIAGNOSIS — M79.672 LEFT FOOT PAIN: ICD-10-CM

## 2021-12-30 LAB
BILIRUB UR QL STRIP: NEGATIVE
CLARITY UR: CLEAR
COLOR UR: YELLOW
GLUCOSE UR QL STRIP: NEGATIVE
HGB UR QL STRIP: NEGATIVE
KETONES UR QL STRIP: NEGATIVE
LEUKOCYTE ESTERASE UR QL STRIP: NEGATIVE
NITRITE UR QL STRIP: NEGATIVE
PH UR STRIP: 6 [PH] (ref 5–8)
PROT UR QL STRIP: ABNORMAL
SP GR UR STRIP: 1.02 (ref 1–1.03)
URN SPEC COLLECT METH UR: ABNORMAL
UROBILINOGEN UR STRIP-ACNC: NEGATIVE EU/DL

## 2021-12-30 PROCEDURE — 63600175 PHARM REV CODE 636 W HCPCS: Performed by: PODIATRIST

## 2021-12-30 PROCEDURE — 27000653 HC CATH, IV CATHLN: Performed by: ANESTHESIOLOGY

## 2021-12-30 PROCEDURE — 27201423 OPTIME MED/SURG SUP & DEVICES STERILE SUPPLY: Performed by: PODIATRIST

## 2021-12-30 PROCEDURE — 71000033 HC RECOVERY, INTIAL HOUR: Performed by: PODIATRIST

## 2021-12-30 PROCEDURE — 27202105 HC BIS BILATERAL SENSOR: Performed by: ANESTHESIOLOGY

## 2021-12-30 PROCEDURE — 63600175 PHARM REV CODE 636 W HCPCS: Performed by: NURSE ANESTHETIST, CERTIFIED REGISTERED

## 2021-12-30 PROCEDURE — 71000015 HC POSTOP RECOV 1ST HR: Performed by: PODIATRIST

## 2021-12-30 PROCEDURE — 37000008 HC ANESTHESIA 1ST 15 MINUTES: Performed by: PODIATRIST

## 2021-12-30 PROCEDURE — 36000707: Performed by: PODIATRIST

## 2021-12-30 PROCEDURE — 71000039 HC RECOVERY, EACH ADD'L HOUR: Performed by: PODIATRIST

## 2021-12-30 PROCEDURE — 36000706: Performed by: PODIATRIST

## 2021-12-30 PROCEDURE — 28080 PR EXCIS INTERDIGITAL NEUROMA,EA: ICD-10-PCS | Mod: LT,,, | Performed by: PODIATRIST

## 2021-12-30 PROCEDURE — 28080 REMOVAL OF FOOT LESION: CPT | Mod: LT,,, | Performed by: PODIATRIST

## 2021-12-30 PROCEDURE — C9290 INJ, BUPIVACAINE LIPOSOME: HCPCS | Performed by: PODIATRIST

## 2021-12-30 PROCEDURE — 37000009 HC ANESTHESIA EA ADD 15 MINS: Performed by: PODIATRIST

## 2021-12-30 PROCEDURE — 27000673 HC TUBING BLOOD Y: Performed by: ANESTHESIOLOGY

## 2021-12-30 PROCEDURE — 25000003 PHARM REV CODE 250: Performed by: PODIATRIST

## 2021-12-30 PROCEDURE — 27000671 HC TUBING MICROBORE EXT: Performed by: ANESTHESIOLOGY

## 2021-12-30 PROCEDURE — 81003 URINALYSIS AUTO W/O SCOPE: CPT | Performed by: PODIATRIST

## 2021-12-30 PROCEDURE — 27201107 HC STYLET, STANDARD: Performed by: ANESTHESIOLOGY

## 2021-12-30 PROCEDURE — 25000003 PHARM REV CODE 250: Performed by: NURSE ANESTHETIST, CERTIFIED REGISTERED

## 2021-12-30 RX ORDER — OXYCODONE HYDROCHLORIDE 5 MG/1
5 TABLET ORAL
Status: DISCONTINUED | OUTPATIENT
Start: 2021-12-30 | End: 2021-12-30 | Stop reason: HOSPADM

## 2021-12-30 RX ORDER — MIDAZOLAM HYDROCHLORIDE 1 MG/ML
INJECTION INTRAMUSCULAR; INTRAVENOUS
Status: DISCONTINUED | OUTPATIENT
Start: 2021-12-30 | End: 2021-12-30

## 2021-12-30 RX ORDER — DEXAMETHASONE SODIUM PHOSPHATE 4 MG/ML
INJECTION, SOLUTION INTRA-ARTICULAR; INTRALESIONAL; INTRAMUSCULAR; INTRAVENOUS; SOFT TISSUE
Status: DISCONTINUED | OUTPATIENT
Start: 2021-12-30 | End: 2021-12-30

## 2021-12-30 RX ORDER — SODIUM CHLORIDE 0.9 % (FLUSH) 0.9 %
10 SYRINGE (ML) INJECTION
Status: DISCONTINUED | OUTPATIENT
Start: 2021-12-30 | End: 2021-12-30 | Stop reason: HOSPADM

## 2021-12-30 RX ORDER — OXYCODONE HYDROCHLORIDE 5 MG/1
5 TABLET ORAL EVERY 4 HOURS PRN
Status: DISCONTINUED | OUTPATIENT
Start: 2021-12-30 | End: 2021-12-30 | Stop reason: HOSPADM

## 2021-12-30 RX ORDER — CEFAZOLIN SODIUM 1 G/50ML
SOLUTION INTRAVENOUS
Status: DISCONTINUED | OUTPATIENT
Start: 2021-12-30 | End: 2021-12-30

## 2021-12-30 RX ORDER — BUPIVACAINE HYDROCHLORIDE 5 MG/ML
INJECTION, SOLUTION EPIDURAL; INTRACAUDAL
Status: DISCONTINUED | OUTPATIENT
Start: 2021-12-30 | End: 2021-12-30 | Stop reason: HOSPADM

## 2021-12-30 RX ORDER — FENTANYL CITRATE 50 UG/ML
INJECTION, SOLUTION INTRAMUSCULAR; INTRAVENOUS
Status: DISCONTINUED | OUTPATIENT
Start: 2021-12-30 | End: 2021-12-30

## 2021-12-30 RX ORDER — ONDANSETRON 2 MG/ML
4 INJECTION INTRAMUSCULAR; INTRAVENOUS DAILY PRN
Status: DISCONTINUED | OUTPATIENT
Start: 2021-12-30 | End: 2021-12-30 | Stop reason: HOSPADM

## 2021-12-30 RX ORDER — FAMOTIDINE 10 MG/ML
INJECTION INTRAVENOUS
Status: DISCONTINUED | OUTPATIENT
Start: 2021-12-30 | End: 2021-12-30

## 2021-12-30 RX ORDER — SUCCINYLCHOLINE CHLORIDE 20 MG/ML
INJECTION INTRAMUSCULAR; INTRAVENOUS
Status: DISCONTINUED | OUTPATIENT
Start: 2021-12-30 | End: 2021-12-30

## 2021-12-30 RX ORDER — PROMETHAZINE HYDROCHLORIDE 25 MG/1
25 TABLET ORAL EVERY 6 HOURS PRN
Status: CANCELLED | OUTPATIENT
Start: 2021-12-30

## 2021-12-30 RX ORDER — ONDANSETRON 4 MG/1
8 TABLET, ORALLY DISINTEGRATING ORAL EVERY 8 HOURS PRN
Status: CANCELLED | OUTPATIENT
Start: 2021-12-30

## 2021-12-30 RX ORDER — PROPOFOL 10 MG/ML
VIAL (ML) INTRAVENOUS
Status: DISCONTINUED | OUTPATIENT
Start: 2021-12-30 | End: 2021-12-30

## 2021-12-30 RX ORDER — ROCURONIUM BROMIDE 10 MG/ML
INJECTION, SOLUTION INTRAVENOUS
Status: DISCONTINUED | OUTPATIENT
Start: 2021-12-30 | End: 2021-12-30

## 2021-12-30 RX ORDER — ACETAMINOPHEN 10 MG/ML
INJECTION, SOLUTION INTRAVENOUS
Status: DISCONTINUED | OUTPATIENT
Start: 2021-12-30 | End: 2021-12-30

## 2021-12-30 RX ORDER — MEPERIDINE HYDROCHLORIDE 50 MG/ML
12.5 INJECTION INTRAMUSCULAR; INTRAVENOUS; SUBCUTANEOUS EVERY 10 MIN PRN
Status: DISCONTINUED | OUTPATIENT
Start: 2021-12-30 | End: 2021-12-30 | Stop reason: HOSPADM

## 2021-12-30 RX ORDER — HYDROCODONE BITARTRATE AND ACETAMINOPHEN 5; 325 MG/1; MG/1
1 TABLET ORAL EVERY 4 HOURS PRN
Status: CANCELLED | OUTPATIENT
Start: 2021-12-30

## 2021-12-30 RX ORDER — ONDANSETRON 4 MG/1
8 TABLET, ORALLY DISINTEGRATING ORAL EVERY 8 HOURS PRN
Qty: 30 TABLET | Refills: 0 | Status: SHIPPED | OUTPATIENT
Start: 2021-12-30 | End: 2022-04-13

## 2021-12-30 RX ORDER — DOXYCYCLINE 100 MG/1
100 CAPSULE ORAL 2 TIMES DAILY
Qty: 14 CAPSULE | Refills: 0 | Status: SHIPPED | OUTPATIENT
Start: 2021-12-30 | End: 2022-01-06

## 2021-12-30 RX ORDER — HYDROCODONE BITARTRATE AND ACETAMINOPHEN 10; 325 MG/1; MG/1
1 TABLET ORAL EVERY 4 HOURS PRN
Qty: 30 TABLET | Refills: 0 | Status: SHIPPED | OUTPATIENT
Start: 2021-12-30 | End: 2022-01-04 | Stop reason: SDUPTHER

## 2021-12-30 RX ORDER — DIPHENHYDRAMINE HYDROCHLORIDE 50 MG/ML
12.5 INJECTION INTRAMUSCULAR; INTRAVENOUS
Status: DISCONTINUED | OUTPATIENT
Start: 2021-12-30 | End: 2021-12-30 | Stop reason: HOSPADM

## 2021-12-30 RX ORDER — ONDANSETRON 2 MG/ML
INJECTION INTRAMUSCULAR; INTRAVENOUS
Status: DISCONTINUED | OUTPATIENT
Start: 2021-12-30 | End: 2021-12-30

## 2021-12-30 RX ORDER — LIDOCAINE HYDROCHLORIDE 10 MG/ML
INJECTION, SOLUTION EPIDURAL; INFILTRATION; INTRACAUDAL; PERINEURAL
Status: DISCONTINUED | OUTPATIENT
Start: 2021-12-30 | End: 2021-12-30

## 2021-12-30 RX ORDER — ONDANSETRON 4 MG/1
4 TABLET, ORALLY DISINTEGRATING ORAL ONCE AS NEEDED
Status: DISCONTINUED | OUTPATIENT
Start: 2021-12-30 | End: 2021-12-30 | Stop reason: HOSPADM

## 2021-12-30 RX ORDER — HYDROMORPHONE HYDROCHLORIDE 1 MG/ML
0.2 INJECTION, SOLUTION INTRAMUSCULAR; INTRAVENOUS; SUBCUTANEOUS EVERY 5 MIN PRN
Status: DISCONTINUED | OUTPATIENT
Start: 2021-12-30 | End: 2021-12-30 | Stop reason: HOSPADM

## 2021-12-30 RX ORDER — HYDROCODONE BITARTRATE AND ACETAMINOPHEN 10; 325 MG/1; MG/1
1 TABLET ORAL EVERY 4 HOURS PRN
Status: CANCELLED | OUTPATIENT
Start: 2021-12-30

## 2021-12-30 RX ORDER — SODIUM CHLORIDE, SODIUM LACTATE, POTASSIUM CHLORIDE, CALCIUM CHLORIDE 600; 310; 30; 20 MG/100ML; MG/100ML; MG/100ML; MG/100ML
INJECTION, SOLUTION INTRAVENOUS CONTINUOUS PRN
Status: DISCONTINUED | OUTPATIENT
Start: 2021-12-30 | End: 2021-12-30

## 2021-12-30 RX ADMIN — MIDAZOLAM HYDROCHLORIDE 2 MG: 1 INJECTION, SOLUTION INTRAMUSCULAR; INTRAVENOUS at 09:12

## 2021-12-30 RX ADMIN — CEFAZOLIN SODIUM 2 G: 1 SOLUTION INTRAVENOUS at 09:12

## 2021-12-30 RX ADMIN — SUCCINYLCHOLINE CHLORIDE 160 MG: 20 INJECTION, SOLUTION INTRAMUSCULAR; INTRAVENOUS at 09:12

## 2021-12-30 RX ADMIN — SODIUM CHLORIDE, SODIUM LACTATE, POTASSIUM CHLORIDE, AND CALCIUM CHLORIDE: .6; .31; .03; .02 INJECTION, SOLUTION INTRAVENOUS at 08:12

## 2021-12-30 RX ADMIN — FAMOTIDINE 20 MG: 10 INJECTION, SOLUTION INTRAVENOUS at 09:12

## 2021-12-30 RX ADMIN — ACETAMINOPHEN 1000 MG: 10 INJECTION, SOLUTION INTRAVENOUS at 09:12

## 2021-12-30 RX ADMIN — SUGAMMADEX 200 MG: 100 INJECTION, SOLUTION INTRAVENOUS at 10:12

## 2021-12-30 RX ADMIN — LIDOCAINE HYDROCHLORIDE 100 MG: 10 INJECTION, SOLUTION EPIDURAL; INFILTRATION; INTRACAUDAL; PERINEURAL at 09:12

## 2021-12-30 RX ADMIN — FENTANYL CITRATE 100 MCG: 50 INJECTION INTRAMUSCULAR; INTRAVENOUS at 09:12

## 2021-12-30 RX ADMIN — FENTANYL CITRATE 100 MCG: 50 INJECTION INTRAMUSCULAR; INTRAVENOUS at 10:12

## 2021-12-30 RX ADMIN — ROCURONIUM BROMIDE 35 MG: 10 INJECTION, SOLUTION INTRAVENOUS at 09:12

## 2021-12-30 RX ADMIN — ONDANSETRON 4 MG: 2 INJECTION INTRAMUSCULAR; INTRAVENOUS at 09:12

## 2021-12-30 RX ADMIN — PROPOFOL 200 MG: 10 INJECTION, EMULSION INTRAVENOUS at 09:12

## 2021-12-30 RX ADMIN — DEXAMETHASONE SODIUM PHOSPHATE 8 MG: 4 INJECTION, SOLUTION INTRAMUSCULAR; INTRAVENOUS at 10:12

## 2021-12-30 RX ADMIN — ROCURONIUM BROMIDE 5 MG: 10 INJECTION, SOLUTION INTRAVENOUS at 09:12

## 2021-12-30 NOTE — DISCHARGE INSTRUCTIONS
DISCHARGE INSTRUCTIONS    No driving, operating heavy machinery or signing legal documents x 24 hours  No drinking alcohol x 24 hours or while taking pain medication  You should not be driving while taking pain medication  You should not be running a temp greater than 101     Weight bearing with boot   Do not remove dressing and do not get wet   Wear boot at all times

## 2021-12-30 NOTE — OP NOTE
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  Following induction general anesthesia a well-padded pneumatic ankle tourniquet was placed around the patient's left ankle and set at 250 mmHg.  The left foot was then prepped scrubbed and draped in normal aseptic manner.  A time-out was then called.  An Esmarch bandage was utilized to exsanguinate the left foot and the pneumatic ankle tourniquet was inflated to 250 mmHg.  At this time attention was directed to the patient's left foot utilizing 15. Blade a linear longitudinal incision was made overlying the distal 2nd intermetatarsal space.  Dissection was then carried bluntly through the subcutaneous tissues ensuring  retract all vital neurovascular structures.  Bleeders were cauterized as necessary.  Dissection was carried down and the transverse intermetatarsal ligament was identified and incised utilizing 15. Blade.  A metatarsal  was then placed between the 2nd and 3rd metatarsals and the space was distracted.  At this time the plantar digital nerve was identified and the proximal and distal branches were dissected bluntly.  There was noted to be some scar tissue surrounding the nerve.  Once the proximal distal branches were dissected they were transected with a 15. Blade.  The nerve was freed from surrounding soft tissues and was passed from the  operating field and sent to pathology.  The wound was then flushed with copious amounts of sterile saline.  Bleeders cauterized as necessary.  The subcutaneous tissues were closed utilizing 3-0 Vicryl.  Skin was closed utilizing 4-0 Vicryl in a running subcuticular fashion.  20 cc of a one-to-one mixture 0.5% Marcaine plain and Exparel was utilized a block the surgical site.  The patient's foot was then dressed with Mastisol Steri-Strips 4x4s Kerlix and an Ace wrap.  Pneumatic ankle tourniquet was deflated neurovascular status noted be intact all digits of the left foot.  Patient's left foot was placed in a Cam Walker boot.  Patient tolerated the procedure well.  He had anesthesia reversed and left the operating Room stable vitals.    1. Keep dressings, clean, dry, and intact to surgical extremity.  2. Rest, ice, and elevate the surgical extremity.  3. Limited weight-bearing as tolerated to surgical extremity in Cam Walker boot  4. Take all medication as discussed at discharge.  5. Contact the clinic with any postoperative concerns or complications.  6. Follow up in one week for 1st post op.

## 2021-12-30 NOTE — INTERVAL H&P NOTE
The patient has been examined and the H&P has been reviewed:    I concur with the findings and no changes have occurred since H&P was written.    Anesthesia risks, benefits and alternative options discussed and understood by patient/family.          There are no hospital problems to display for this patient.

## 2022-01-04 ENCOUNTER — OFFICE VISIT (OUTPATIENT)
Dept: PODIATRY | Facility: CLINIC | Age: 42
End: 2022-01-04
Payer: OTHER MISCELLANEOUS

## 2022-01-04 VITALS
OXYGEN SATURATION: 100 % | RESPIRATION RATE: 20 BRPM | HEIGHT: 78 IN | WEIGHT: 315 LBS | SYSTOLIC BLOOD PRESSURE: 128 MMHG | BODY MASS INDEX: 36.45 KG/M2 | DIASTOLIC BLOOD PRESSURE: 86 MMHG | HEART RATE: 80 BPM

## 2022-01-04 DIAGNOSIS — M79.672 LEFT FOOT PAIN: ICD-10-CM

## 2022-01-04 DIAGNOSIS — D36.10 NEUROMA: Primary | ICD-10-CM

## 2022-01-04 PROCEDURE — 99024 POSTOP FOLLOW-UP VISIT: CPT | Mod: S$GLB,,, | Performed by: PODIATRIST

## 2022-01-04 PROCEDURE — 99024 PR POST-OP FOLLOW-UP VISIT: ICD-10-PCS | Mod: S$GLB,,, | Performed by: PODIATRIST

## 2022-01-04 RX ORDER — HYDROCODONE BITARTRATE AND ACETAMINOPHEN 10; 325 MG/1; MG/1
1 TABLET ORAL EVERY 4 HOURS PRN
Qty: 30 TABLET | Refills: 0 | Status: SHIPPED | OUTPATIENT
Start: 2022-01-04 | End: 2022-01-13 | Stop reason: SDUPTHER

## 2022-01-04 NOTE — PROGRESS NOTES
"  [address] [facility] [address]  [place] [address]  Phone: [phone]   [phone]    Patient's PCP:Primary Doctor No  Referring Provider:No ref. provider found    Subjective:      Chief Complaint: Post-Op    Date of Surgery: 12/30/2021  Procedure:   Excision of neuroma 2nd intermetatarsal space left foot    HPI:   Artis Corey is a 41 y.o. male who returns to the clinic today for his post-operative visit. Artis Corey rates pain a 10/10 on a pain scale. Compliance of Care: Weightbearing in cam walker boot cast, ace wrap and dressing still intact. Patient states increased pain in foot due to boot cast.    Do you have a cough? no  Have you had a cough since your surgical procedure ?no  Are you short of breath?no  Have you experienced shortness of breath since your surgical procedure?no  Do you have a fever? no  Did you have a fever since your surgical procedure? no  Any redness at the surgery site? no Warm to the touch? no  Have you been tested for COVID-19 since your surgical procedure? no    Vitals:    01/04/22 1408   BP: 128/86   Pulse: 80   Resp: 20   SpO2: 100%   Weight: (!) 144.2 kg (318 lb)   Height: 6' 6" (1.981 m)   PainSc: 10-Worst pain ever        History reviewed. No pertinent surgical history.  History reviewed. No pertinent past medical history.  History reviewed. No pertinent family history.     Social History:   Marital Status:   Alcohol History:  reports previous alcohol use.  Tobacco History:  reports that he has never smoked. He has never used smokeless tobacco.  Drug History:  reports no history of drug use.    Review of patient's allergies indicates:  No Known Allergies    Current Outpatient Medications   Medication Sig Dispense Refill    doxycycline (MONODOX) 100 MG capsule Take 1 capsule (100 mg total) by mouth 2 (two) times daily. for 7 days 14 capsule 0    HYDROcodone-acetaminophen (NORCO)  mg per tablet Take 1 tablet by mouth every 4 (four) hours as needed for " Pain. 30 tablet 0    ondansetron (ZOFRAN-ODT) 4 MG TbDL Take 2 tablets (8 mg total) by mouth every 8 (eight) hours as needed (nausea). 30 tablet 0     No current facility-administered medications for this visit.       Review of Systems   Constitutional: Negative for chills, fatigue, fever and unexpected weight change.   HENT: Negative for hearing loss and trouble swallowing.    Eyes: Negative for photophobia and visual disturbance.   Respiratory: Negative for cough, shortness of breath and wheezing.    Cardiovascular: Negative for chest pain, palpitations and leg swelling.   Gastrointestinal: Negative for abdominal pain and nausea.   Genitourinary: Negative for dysuria and frequency.   Musculoskeletal: Positive for gait problem and myalgias. Negative for arthralgias, back pain and joint swelling.   Skin: Positive for color change. Negative for rash and wound.   Neurological: Negative for tremors, seizures, weakness, numbness and headaches.   Hematological: Does not bruise/bleed easily.         Objective:        Post-op surgery of the left foot with normal healing, no signs of infection or dehiscence of wound. Hardware in place. Normal post op exam today. No redness, no drainage, no increase in local temperature, no significant swelling, sutures.steri-strips are intact.     Imaging:  None    Physical Exam:   Foot Exam  Physical Exam       Assessment:       1. Neuroma    2. Left foot pain      Plan:   Neuroma  -     HYDROcodone-acetaminophen (NORCO)  mg per tablet; Take 1 tablet by mouth every 4 (four) hours as needed for Pain.  Dispense: 30 tablet; Refill: 0    Left foot pain  -     HYDROcodone-acetaminophen (NORCO)  mg per tablet; Take 1 tablet by mouth every 4 (four) hours as needed for Pain.  Dispense: 30 tablet; Refill: 0      Follow up if symptoms worsen or fail to improve.    Procedures - None    The surgical dressing was removed showing no signs of infection, excess edema or malalignment. A new  dry dressing was applied and patient was instructed to leave dressing intact until next visit or until instructed to remove.     CAM walker boot with weight bearing  Ice to painful area, 15 minutes at a time  Ace-wrap to help with swelling  No barefoot   Keep affected extremity elevated while seated      This note was created using Dragon voice recognition software that occasionally misinterpreted phrases or words.

## 2022-01-04 NOTE — PATIENT INSTRUCTIONS
Santos's Neuroma (Intermetatarsal Neuroma)    What Is a Neuroma?  A neuroma is a thickening of nerve tissue that may develop in various parts of the body. The most common neuroma in the foot is a Santos's neuroma, which occurs between the third and fourth toes. It is sometimes referred to as an intermetatarsal neuroma. Intermetatarsal describes its location in the ball of the foot between the metatarsal bones. Neuromas may also occur in other locations in the foot.    The thickening of the nerve that defines a neuroma is the result of compression and irritation of the nerve. This compression creates enlargement of the nerve, causing the symptoms of Santos's neuroma and eventually leading to permanent nerve damage.      Causes of Santos's Neuroma  Anything that causes compression or irritation of the nerve can lead to the development of a neuroma. One of the most common offenders is wearing shoes that have a tapered toe box or high-heeled shoes that cause the toes to be forced into the toe box. People with certain foot deformities--bunions, hammertoes, flatfeet or more flexible feet--are at higher risk for developing a neuroma. Other potential causes are activities that involve repetitive irritation to the ball of the foot, such as running or court sports. An injury or other type of trauma to the area may also lead to a neuroma.      Symptoms of Santos's Neuroma  If you have a Santos's neuroma, you may have one or more of these symptoms where the nerve damage is occurring:  ? Tingling, burning or numbness  ? Pain  ? A feeling that something is inside the ball of the foot  ? A feeling that there is something in the shoe or a sock is bunched up    The progression of a Santos's neuroma often follows this pattern:  ? The symptoms begin gradually. At first, they occur only occasionally when wearing narrow-toed shoes or performing certain aggravating activities.  ? The symptoms may go away temporarily by removing the shoe,  massaging the foot or avoiding aggravating shoes or activities.  ? Over time, the symptoms progressively worsen and may persist for several days or weeks.  ? The symptoms become more intense as the neuroma enlarges and the temporary changes in the nerve become permanent.      Diagnosis of Santos's Neuroma  To arrive at a diagnosis, the foot and ankle surgeon will obtain a thorough history of your symptoms and examine your foot. During the physical examination, the doctor attempts to reproduce your symptoms by manipulating your foot. Other tests or imaging studies may be performed.    The best time to see your foot and ankle surgeon is early in the development of symptoms. Early diagnosis of a Santos's neuroma greatly lessens the need for more invasive treatments and may help you avoid surgery.      Nonsurgical Treatment  In developing a treatment plan, your foot and ankle surgeon will first determine how long you have had the neuroma and will evaluate its stage of development. Treatment approaches vary according to the severity of the problem.  For mild to moderate neuromas, treatment options may include:  ? Padding. Padding techniques provide support for the metatarsal arch, thereby lessening the pressure on the nerve and decreasing the compression when walking.  ? Icing. Placing an ice pack on the affected area helps reduce swelling.  ? Orthotic devices. Custom orthotic devices provided by your foot and ankle surgeon provide the support needed to reduce pressure and compression on the nerve.  ? Activity modifications. Activities that put repetitive pressure on the neuroma should be avoided until the condition improves.  ? Shoe modifications. Wear shoes with a wide toe box and avoid narrow-toed shoes or shoes with high heels.  ? Medications. Oral nonsteroidal anti-inflammatory drugs (NSAIDs), such as ibuprofen, may be recommended to reduce pain and inflammation.  ? Injection therapy. Treatment may include  injections of cortisone, local anesthetics or other agents.      When Is Surgery Needed?  Surgery may be considered in patients who have not responded adequately to nonsurgical treatments. Your foot and ankle surgeon will determine the approach that is best for your condition. The length of the recovery period will vary depending on the procedure performed.  Regardless of whether you have undergone surgical or nonsurgical treatment, your surgeon will recommend long-term measures to help keep your symptoms from returning. These include appropriate footwear and modification of activities to reduce the repetitive pressure on the foot.      Why choose a foot and ankle surgeon?  Foot and ankle surgeons are the leading experts in foot and ankle care today. As doctors of podiatric medicine - also known as podiatrists, DPMs or occasionally foot and ankle doctors - they are the board-certified surgical specialists of the podiatric profession. Foot and ankle surgeons have more education and training specific to the foot and ankle than any other healthcare provider.  Foot and ankle surgeons treat all conditions affecting the foot and ankle, from the simple to the complex, in patients of all ages including Santos's neuroma. Their intensive education and training qualify foot and ankle surgeons to perform a wide range of surgeries, including any surgery that may be indicated for Santos's neuroma.

## 2022-01-12 ENCOUNTER — OFFICE VISIT (OUTPATIENT)
Dept: PODIATRY | Facility: CLINIC | Age: 42
End: 2022-01-12
Payer: OTHER MISCELLANEOUS

## 2022-01-12 VITALS
HEART RATE: 78 BPM | HEIGHT: 78 IN | SYSTOLIC BLOOD PRESSURE: 124 MMHG | RESPIRATION RATE: 18 BRPM | WEIGHT: 315 LBS | BODY MASS INDEX: 36.45 KG/M2 | DIASTOLIC BLOOD PRESSURE: 82 MMHG | OXYGEN SATURATION: 99 %

## 2022-01-12 DIAGNOSIS — M79.672 LEFT FOOT PAIN: ICD-10-CM

## 2022-01-12 DIAGNOSIS — D36.10 NEUROMA: Primary | ICD-10-CM

## 2022-01-12 PROCEDURE — 99024 PR POST-OP FOLLOW-UP VISIT: ICD-10-PCS | Mod: S$GLB,,, | Performed by: PODIATRIST

## 2022-01-12 PROCEDURE — 99024 POSTOP FOLLOW-UP VISIT: CPT | Mod: S$GLB,,, | Performed by: PODIATRIST

## 2022-01-12 RX ORDER — NAPROXEN 500 MG/1
500 TABLET ORAL 2 TIMES DAILY
Qty: 60 TABLET | Refills: 1 | Status: SHIPPED | OUTPATIENT
Start: 2022-01-12 | End: 2022-02-09

## 2022-01-12 NOTE — PROGRESS NOTES
"  [address] [facility] [address] [facility] [place] [address]  Phone: [phone]   [phone]    Patient's PCP:Primary Doctor No  Referring Provider:No ref. provider found    Subjective:      Chief Complaint: Post-Op    Date of Surgery: 12/30/2021  Procedure: Excision of neuroma 2nd intermetatarsal space left foot    HPI:   Artis Corey is a 41 y.o. male who returns to the clinic today for his post-operative visit. Artis Corey rates pain a 7/10 on a pain scale. Compliance of Care: weightbearing in cam walker booting cast.     Do you have a cough? no  Have you had a cough since your surgical procedure ?no  Are you short of breath?no  Have you experienced shortness of breath since your surgical procedure?no  Do you have a fever? no  Did you have a fever since your surgical procedure? no  Any redness at the surgery site? no Warm to the touch? no  Have you been tested for COVID-19 since your surgical procedure? no    Vitals:    01/12/22 1530   BP: 124/82   Pulse: 78   Resp: 18   SpO2: 99%   Weight: (!) 144.2 kg (318 lb)   Height: 6' 6" (1.981 m)   PainSc:   7        History reviewed. No pertinent surgical history.  History reviewed. No pertinent past medical history.  History reviewed. No pertinent family history.     Social History:   Marital Status:   Alcohol History:  reports previous alcohol use.  Tobacco History:  reports that he has never smoked. He has never used smokeless tobacco.  Drug History:  reports no history of drug use.    Review of patient's allergies indicates:  No Known Allergies    Current Outpatient Medications   Medication Sig Dispense Refill    HYDROcodone-acetaminophen (NORCO)  mg per tablet Take 1 tablet by mouth every 4 (four) hours as needed for Pain. 30 tablet 0    naproxen (NAPROSYN) 500 MG tablet Take 1 tablet (500 mg total) by mouth 2 (two) times daily. 60 tablet 1    ondansetron (ZOFRAN-ODT) 4 MG TbDL Take 2 tablets (8 mg total) by mouth every 8 (eight) hours as " needed (nausea). 30 tablet 0     No current facility-administered medications for this visit.       Review of Systems   Constitutional: Negative for chills, fatigue, fever and unexpected weight change.   HENT: Negative for hearing loss and trouble swallowing.    Eyes: Negative for photophobia and visual disturbance.   Respiratory: Negative for cough, shortness of breath and wheezing.    Cardiovascular: Negative for chest pain, palpitations and leg swelling.   Gastrointestinal: Negative for abdominal pain and nausea.   Genitourinary: Negative for dysuria and frequency.   Musculoskeletal: Positive for gait problem and myalgias. Negative for arthralgias, back pain and joint swelling.   Skin: Negative for rash and wound.   Neurological: Negative for tremors, seizures, weakness, numbness and headaches.   Hematological: Does not bruise/bleed easily.         Objective:        Post-op surgery of the left foot with normal healing, no signs of infection or dehiscence of wound.  Normal post op exam today. No redness, no drainage, no increase in local temperature, no significant swelling, sutures.steri-strips are intact.     Imaging:  None    Physical Exam:   Foot Exam  Physical Exam       Assessment:       1. Neuroma    2. Left foot pain      Plan:   Neuroma  -     naproxen (NAPROSYN) 500 MG tablet; Take 1 tablet (500 mg total) by mouth 2 (two) times daily.  Dispense: 60 tablet; Refill: 1  -     HYDROcodone-acetaminophen (NORCO)  mg per tablet; Take 1 tablet by mouth every 4 (four) hours as needed for Pain.  Dispense: 30 tablet; Refill: 0    Left foot pain  -     naproxen (NAPROSYN) 500 MG tablet; Take 1 tablet (500 mg total) by mouth 2 (two) times daily.  Dispense: 60 tablet; Refill: 1  -     HYDROcodone-acetaminophen (NORCO)  mg per tablet; Take 1 tablet by mouth every 4 (four) hours as needed for Pain.  Dispense: 30 tablet; Refill: 0      Follow up in 4 weeks (on 2/9/2022), or if symptoms worsen or fail to  improve, for post op 12/30/21 Excision of neuroma 2nd intermetatarsal space left foot.    Procedures - Steri-Strips removed.  Surgical incision is well healed.    The surgical dressing was removed showing no signs of infection, excess edema or malalignment. A new dry dressing was applied and patient was instructed to leave dressing intact until next visit or until instructed to remove.     CAM walker boot with weight bearing  Ice to painful area, 15 minutes at a time  Ace-wrap to help with swelling  No barefoot   Keep affected extremity elevated while seated    Patient to continue to limit any heavy impact activities.  Encourage patient to keep the foot elevated to help with swelling when he is not weight-bearing.    This note was created using Dragon voice recognition software that occasionally misinterpreted phrases or words.

## 2022-01-13 RX ORDER — HYDROCODONE BITARTRATE AND ACETAMINOPHEN 10; 325 MG/1; MG/1
1 TABLET ORAL EVERY 4 HOURS PRN
Qty: 30 TABLET | Refills: 0 | Status: SHIPPED | OUTPATIENT
Start: 2022-01-13 | End: 2022-04-13 | Stop reason: SDUPTHER

## 2022-02-09 ENCOUNTER — OFFICE VISIT (OUTPATIENT)
Dept: PODIATRY | Facility: CLINIC | Age: 42
End: 2022-02-09
Payer: OTHER MISCELLANEOUS

## 2022-02-09 VITALS — RESPIRATION RATE: 16 BRPM | HEIGHT: 78 IN | BODY MASS INDEX: 36.45 KG/M2 | WEIGHT: 315 LBS

## 2022-02-09 DIAGNOSIS — M79.672 LEFT FOOT PAIN: ICD-10-CM

## 2022-02-09 DIAGNOSIS — D36.10 NEUROMA: Primary | ICD-10-CM

## 2022-02-09 PROCEDURE — 99024 PR POST-OP FOLLOW-UP VISIT: ICD-10-PCS | Mod: S$GLB,,, | Performed by: PODIATRIST

## 2022-02-09 PROCEDURE — 99024 POSTOP FOLLOW-UP VISIT: CPT | Mod: S$GLB,,, | Performed by: PODIATRIST

## 2022-02-09 RX ORDER — METHYLPREDNISOLONE 4 MG/1
TABLET ORAL
Qty: 1 EACH | Refills: 0 | Status: SHIPPED | OUTPATIENT
Start: 2022-02-09 | End: 2022-04-13

## 2022-02-09 NOTE — PROGRESS NOTES
"  [address] [place] [address]  Phone: [phone]   [phone]    Patient's PCP:Primary Doctor No  Referring Provider:No ref. provider found    Subjective:      Chief Complaint: Post-Op    Date of Surgery: 12/30/2021  Procedure: Excision of neuroma 2nd intermetatarsal space left foot    HPI:   Artis Corey is a 41 y.o. male who returns to the clinic today for his post-operative visit. Artis Corey rates pain a 7/10 on a pain scale. Compliance of Care:   Cam walker boot cast. Pt states he tried weight bearing in a normal shoe, however was unable to due to discomfort. Pt states he is having "shocking numb" sensations to the surgical site.     Vitals:    02/09/22 1110   Resp: 16   Weight: (!) 144.2 kg (318 lb)   Height: 6' 6" (1.981 m)   PainSc:   7        History reviewed. No pertinent surgical history.  History reviewed. No pertinent past medical history.  History reviewed. No pertinent family history.     Social History:   Marital Status:   Alcohol History:  reports previous alcohol use.  Tobacco History:  reports that he has never smoked. He has never used smokeless tobacco.  Drug History:  reports no history of drug use.    Review of patient's allergies indicates:  No Known Allergies    Current Outpatient Medications   Medication Sig Dispense Refill    HYDROcodone-acetaminophen (NORCO)  mg per tablet Take 1 tablet by mouth every 4 (four) hours as needed for Pain. 30 tablet 0    ondansetron (ZOFRAN-ODT) 4 MG TbDL Take 2 tablets (8 mg total) by mouth every 8 (eight) hours as needed (nausea). 30 tablet 0    methylPREDNISolone (MEDROL DOSEPACK) 4 mg tablet use as directed 1 each 0     No current facility-administered medications for this visit.       Review of Systems   Constitutional: Negative for chills, fatigue, fever and unexpected weight change.   HENT: Negative for hearing loss and trouble swallowing.    Eyes: Negative for photophobia and visual disturbance. "   Respiratory: Negative for cough, shortness of breath and wheezing.    Cardiovascular: Negative for chest pain, palpitations and leg swelling.   Gastrointestinal: Negative for abdominal pain and nausea.   Genitourinary: Negative for dysuria and frequency.   Musculoskeletal: Positive for gait problem. Negative for arthralgias, back pain, joint swelling and myalgias.   Skin: Negative for rash and wound.   Neurological: Negative for tremors, seizures, weakness, numbness and headaches.   Hematological: Does not bruise/bleed easily.         Objective:        Post-op surgery of the left foot with normal healing, no signs of infection or dehiscence of wound. Normal post op exam today. No redness, no drainage, no increase in local temperature, no significant swelling, surgical incision is well healed. Moderately tender to the plantar second IS.     Imaging: none     Physical Exam:   Foot Exam  Physical Exam       Assessment:       1. Neuroma    2. Left foot pain      Plan:   Neuroma  -     methylPREDNISolone (MEDROL DOSEPACK) 4 mg tablet; use as directed  Dispense: 1 each; Refill: 0    Left foot pain  -     methylPREDNISolone (MEDROL DOSEPACK) 4 mg tablet; use as directed  Dispense: 1 each; Refill: 0      Follow up in about 4 weeks (around 3/9/2022), or if symptoms worsen or fail to improve.    Procedures - None    I discussed with the patient that it is normal to have some level of numb sensations following neuroma surgery. I recommend he continue to weight bear in the CAM walker boot, however, I am still okay with him transitioning to normal shoe gear as soon as symptoms allow. I am going to send in a steroid pack to see if this helps with his pain.    CAM walker boot with weight bearing  Ice to painful area, 15 minutes at a time  Ace-wrap to help with swelling  No barefoot   Keep affected extremity elevated while seated      This note was created using Dragon voice recognition software that occasionally misinterpreted  phrases or words.

## 2022-02-21 ENCOUNTER — TELEPHONE (OUTPATIENT)
Dept: PODIATRY | Facility: CLINIC | Age: 42
End: 2022-02-21
Payer: OTHER MISCELLANEOUS

## 2022-02-21 NOTE — TELEPHONE ENCOUNTER
----- Message from Kymberly Amezquita sent at 2/21/2022  2:41 PM CST -----  Vivi with workers comp is calling regarding  Corey, please call her back at [phone] regarding return to work status and mmi.    Thanks  Beena

## 2022-02-23 NOTE — TELEPHONE ENCOUNTER
Spoke with Vivi at  regarding patient's return to work status. Work[employer] is going to send in return to work status for light duty to be filled out by Dr. Feliz. Fax provided.

## 2022-03-09 ENCOUNTER — OFFICE VISIT (OUTPATIENT)
Dept: PODIATRY | Facility: CLINIC | Age: 42
End: 2022-03-09
Payer: OTHER MISCELLANEOUS

## 2022-03-09 VITALS — WEIGHT: 315 LBS | HEIGHT: 78 IN | BODY MASS INDEX: 36.45 KG/M2

## 2022-03-09 DIAGNOSIS — M79.672 LEFT FOOT PAIN: ICD-10-CM

## 2022-03-09 DIAGNOSIS — D36.10 NEUROMA: Primary | ICD-10-CM

## 2022-03-09 PROCEDURE — 99024 POSTOP FOLLOW-UP VISIT: CPT | Mod: S$GLB,,, | Performed by: PODIATRIST

## 2022-03-09 PROCEDURE — 64455 NJX AA&/STRD PLTR COM DG NRV: CPT | Mod: 79,LT,S$GLB, | Performed by: PODIATRIST

## 2022-03-09 PROCEDURE — 99024 PR POST-OP FOLLOW-UP VISIT: ICD-10-PCS | Mod: S$GLB,,, | Performed by: PODIATRIST

## 2022-03-09 PROCEDURE — 64455 PR INJECT ANES/STEROID PLANTAR COMMON DIGITAL NERVE: ICD-10-PCS | Mod: 79,LT,S$GLB, | Performed by: PODIATRIST

## 2022-03-09 RX ORDER — METHYLPREDNISOLONE ACETATE 40 MG/ML
20 INJECTION, SUSPENSION INTRA-ARTICULAR; INTRALESIONAL; INTRAMUSCULAR; SOFT TISSUE
Status: COMPLETED | OUTPATIENT
Start: 2022-03-09 | End: 2022-03-09

## 2022-03-09 RX ORDER — DEXAMETHASONE SODIUM PHOSPHATE 4 MG/ML
4 INJECTION, SOLUTION INTRA-ARTICULAR; INTRALESIONAL; INTRAMUSCULAR; INTRAVENOUS; SOFT TISSUE
Status: COMPLETED | OUTPATIENT
Start: 2022-03-09 | End: 2022-03-09

## 2022-03-09 RX ORDER — BUPIVACAINE HYDROCHLORIDE 5 MG/ML
1.5 INJECTION, SOLUTION PERINEURAL
Status: COMPLETED | OUTPATIENT
Start: 2022-03-09 | End: 2022-03-09

## 2022-03-09 RX ADMIN — BUPIVACAINE HYDROCHLORIDE 7.5 MG: 5 INJECTION, SOLUTION PERINEURAL at 11:03

## 2022-03-09 RX ADMIN — DEXAMETHASONE SODIUM PHOSPHATE 4 MG: 4 INJECTION, SOLUTION INTRA-ARTICULAR; INTRALESIONAL; INTRAMUSCULAR; INTRAVENOUS; SOFT TISSUE at 11:03

## 2022-03-09 RX ADMIN — METHYLPREDNISOLONE ACETATE 20 MG: 40 INJECTION, SUSPENSION INTRA-ARTICULAR; INTRALESIONAL; INTRAMUSCULAR; SOFT TISSUE at 11:03

## 2022-03-09 NOTE — PROGRESS NOTES
"  [address] [place] [address]  Phone: [phone]   [phone]    Patient's PCP:Primary Doctor No  Referring Provider:No ref. provider found    Subjective:      Chief Complaint: Post-Op Evaluation    Date of Surgery: 12/30/2021  Procedure: Excision of neuroma 2nd intermetatarsal space left foot    HPI:   Artis Corey is a 41 y.o. male who returns to the clinic today for his post-operative visit. Artis Corey rates pain a 7/10 on a pain scale. Compliance of Care:   Cam walker boot cast. Patient has still not been able to transition into a running shoe.  Patient states that his symptoms have improved slightly although he is still having tingling and numb sensations.    Vitals:    03/09/22 1118   Weight: (!) 144.2 kg (318 lb)   Height: 6' 6" (1.981 m)   PainSc:   7        History reviewed. No pertinent surgical history.  History reviewed. No pertinent past medical history.  History reviewed. No pertinent family history.     Social History:   Marital Status:   Alcohol History:  reports previous alcohol use.  Tobacco History:  reports that he has never smoked. He has never used smokeless tobacco.  Drug History:  reports no history of drug use.    Review of patient's allergies indicates:  No Known Allergies    Current Outpatient Medications   Medication Sig Dispense Refill    HYDROcodone-acetaminophen (NORCO)  mg per tablet Take 1 tablet by mouth every 4 (four) hours as needed for Pain. 30 tablet 0    methylPREDNISolone (MEDROL DOSEPACK) 4 mg tablet use as directed 1 each 0    ondansetron (ZOFRAN-ODT) 4 MG TbDL Take 2 tablets (8 mg total) by mouth every 8 (eight) hours as needed (nausea). 30 tablet 0     No current facility-administered medications for this visit.       Review of Systems   Constitutional: Negative.  Negative for chills, fatigue, fever and unexpected weight change.   HENT: Negative.  Negative for hearing loss and trouble swallowing.    Eyes: Negative.  " Negative for photophobia and visual disturbance.   Respiratory: Negative.  Negative for cough, shortness of breath and wheezing.    Cardiovascular: Negative.  Negative for chest pain, palpitations and leg swelling.   Gastrointestinal: Negative.  Negative for abdominal pain and nausea.   Endocrine: Negative.    Genitourinary: Negative.  Negative for dysuria and frequency.   Musculoskeletal: Positive for gait problem. Negative for arthralgias, back pain, joint swelling and myalgias.   Skin: Negative.  Negative for rash and wound.   Allergic/Immunologic: Negative.    Neurological: Positive for numbness. Negative for tremors, seizures, weakness and headaches.   Hematological: Negative.  Does not bruise/bleed easily.   Psychiatric/Behavioral: Negative.          Objective:        Post-op surgery of the left foot with normal healing, no signs of infection or dehiscence of wound. Normal post op exam today. No redness, no drainage, no increase in local temperature, no significant swelling, surgical incision is well healed.  There is some numbness in the 2nd intermetatarsal space.    Imaging:  None    Physical Exam:   Foot Exam  Physical Exam       Assessment:       1. Neuroma    2. Left foot pain      Plan:   Neuroma  -     methylPREDNISolone acetate injection 20 mg  -     BUPivacaine injection 7.5 mg  -     dexamethasone injection 4 mg    Left foot pain  -     methylPREDNISolone acetate injection 20 mg  -     BUPivacaine injection 7.5 mg  -     dexamethasone injection 4 mg      Follow up if symptoms worsen or fail to improve.    Procedures - Informed consent was obtained.  Time-out was called.  The area was prepped with alcohol, and a steroid injection was performed at left 2nd intermetatarsal space using 1.5 cc of 0.5% Marcaine w/out epi, 1 cc Dexamethasone, 0.5 cc Methylprednisolone. Patient tolerated the procedure well.     I discussed with the patient that his symptoms are progressing, although slowly.  I discussed with  him that it is normal following neuroma excision to have numb sensation at the surgical site.  Because patient did have some benefit from the steroid pack that we gave him last time today I recommend steroid injection.  I explained that this will help with any a inflammation in the area as well as any excess scar tissue that may be present.    I still encouraged the patient to transition from the cam walker boot to normal shoe gear once symptoms allow.  Continue icing as needed for pain and swelling.    This note was created using Dragon voice recognition software that occasionally misinterpreted phrases or words.

## 2022-03-09 NOTE — PATIENT INSTRUCTIONS
Santos's Neuroma (Intermetatarsal Neuroma)    What Is a Neuroma?  A neuroma is a thickening of nerve tissue that may develop in various parts of the body. The most common neuroma in the foot is a Santos's neuroma, which occurs between the third and fourth toes. It is sometimes referred to as an intermetatarsal neuroma. Intermetatarsal describes its location in the ball of the foot between the metatarsal bones. Neuromas may also occur in other locations in the foot.    The thickening of the nerve that defines a neuroma is the result of compression and irritation of the nerve. This compression creates enlargement of the nerve, causing the symptoms of Santos's neuroma and eventually leading to permanent nerve damage.      Causes of Santos's Neuroma  Anything that causes compression or irritation of the nerve can lead to the development of a neuroma. One of the most common offenders is wearing shoes that have a tapered toe box or high-heeled shoes that cause the toes to be forced into the toe box. People with certain foot deformities--bunions, hammertoes, flatfeet or more flexible feet--are at higher risk for developing a neuroma. Other potential causes are activities that involve repetitive irritation to the ball of the foot, such as running or court sports. An injury or other type of trauma to the area may also lead to a neuroma.      Symptoms of Asntos's Neuroma  If you have a Santos's neuroma, you may have one or more of these symptoms where the nerve damage is occurring:  Tingling, burning or numbness  Pain  A feeling that something is inside the ball of the foot  A feeling that there is something in the shoe or a sock is bunched up    The progression of a Santos's neuroma often follows this pattern:  The symptoms begin gradually. At first, they occur only occasionally when wearing narrow-toed shoes or performing certain aggravating activities.  The symptoms may go away temporarily by removing the shoe, massaging  the foot or avoiding aggravating shoes or activities.  Over time, the symptoms progressively worsen and may persist for several days or weeks.  The symptoms become more intense as the neuroma enlarges and the temporary changes in the nerve become permanent.      Diagnosis of Santos's Neuroma  To arrive at a diagnosis, the foot and ankle surgeon will obtain a thorough history of your symptoms and examine your foot. During the physical examination, the doctor attempts to reproduce your symptoms by manipulating your foot. Other tests or imaging studies may be performed.    The best time to see your foot and ankle surgeon is early in the development of symptoms. Early diagnosis of a Santos's neuroma greatly lessens the need for more invasive treatments and may help you avoid surgery.      Nonsurgical Treatment  In developing a treatment plan, your foot and ankle surgeon will first determine how long you have had the neuroma and will evaluate its stage of development. Treatment approaches vary according to the severity of the problem.  For mild to moderate neuromas, treatment options may include:  Padding. Padding techniques provide support for the metatarsal arch, thereby lessening the pressure on the nerve and decreasing the compression when walking.  Icing. Placing an ice pack on the affected area helps reduce swelling.  Orthotic devices. Custom orthotic devices provided by your foot and ankle surgeon provide the support needed to reduce pressure and compression on the nerve.  Activity modifications. Activities that put repetitive pressure on the neuroma should be avoided until the condition improves.  Shoe modifications. Wear shoes with a wide toe box and avoid narrow-toed shoes or shoes with high heels.  Medications. Oral nonsteroidal anti-inflammatory drugs (NSAIDs), such as ibuprofen, may be recommended to reduce pain and inflammation.  Injection therapy. Treatment may include injections of cortisone, local  anesthetics or other agents.      When Is Surgery Needed?  Surgery may be considered in patients who have not responded adequately to nonsurgical treatments. Your foot and ankle surgeon will determine the approach that is best for your condition. The length of the recovery period will vary depending on the procedure performed.  Regardless of whether you have undergone surgical or nonsurgical treatment, your surgeon will recommend long-term measures to help keep your symptoms from returning. These include appropriate footwear and modification of activities to reduce the repetitive pressure on the foot.      Why choose a foot and ankle surgeon?  Foot and ankle surgeons are the leading experts in foot and ankle care today. As doctors of podiatric medicine - also known as podiatrists, DPMs or occasionally foot and ankle doctors - they are the board-certified surgical specialists of the podiatric profession. Foot and ankle surgeons have more education and training specific to the foot and ankle than any other healthcare provider.  Foot and ankle surgeons treat all conditions affecting the foot and ankle, from the simple to the complex, in patients of all ages including Santos's neuroma. Their intensive education and training qualify foot and ankle surgeons to perform a wide range of surgeries, including any surgery that may be indicated for Santos's neuroma.

## 2022-03-14 ENCOUNTER — PATIENT MESSAGE (OUTPATIENT)
Dept: PODIATRY | Facility: CLINIC | Age: 42
End: 2022-03-14
Payer: OTHER MISCELLANEOUS

## 2022-03-16 ENCOUNTER — OFFICE VISIT (OUTPATIENT)
Dept: PODIATRY | Facility: CLINIC | Age: 42
End: 2022-03-16
Payer: OTHER MISCELLANEOUS

## 2022-03-16 VITALS
OXYGEN SATURATION: 98 % | RESPIRATION RATE: 18 BRPM | HEART RATE: 80 BPM | WEIGHT: 315 LBS | HEIGHT: 78 IN | BODY MASS INDEX: 36.45 KG/M2

## 2022-03-16 DIAGNOSIS — M79.672 LEFT FOOT PAIN: ICD-10-CM

## 2022-03-16 DIAGNOSIS — L90.5 SCAR TISSUE: ICD-10-CM

## 2022-03-16 DIAGNOSIS — D36.10 NEUROMA: Primary | ICD-10-CM

## 2022-03-16 PROCEDURE — 99024 PR POST-OP FOLLOW-UP VISIT: ICD-10-PCS | Mod: S$GLB,,, | Performed by: PODIATRIST

## 2022-03-16 PROCEDURE — 99024 POSTOP FOLLOW-UP VISIT: CPT | Mod: S$GLB,,, | Performed by: PODIATRIST

## 2022-03-16 NOTE — PATIENT INSTRUCTIONS
Santos's Neuroma (Intermetatarsal Neuroma)    What Is a Neuroma?  A neuroma is a thickening of nerve tissue that may develop in various parts of the body. The most common neuroma in the foot is a Santos's neuroma, which occurs between the third and fourth toes. It is sometimes referred to as an intermetatarsal neuroma. Intermetatarsal describes its location in the ball of the foot between the metatarsal bones. Neuromas may also occur in other locations in the foot.    The thickening of the nerve that defines a neuroma is the result of compression and irritation of the nerve. This compression creates enlargement of the nerve, causing the symptoms of Santos's neuroma and eventually leading to permanent nerve damage.      Causes of Santos's Neuroma  Anything that causes compression or irritation of the nerve can lead to the development of a neuroma. One of the most common offenders is wearing shoes that have a tapered toe box or high-heeled shoes that cause the toes to be forced into the toe box. People with certain foot deformities--bunions, hammertoes, flatfeet or more flexible feet--are at higher risk for developing a neuroma. Other potential causes are activities that involve repetitive irritation to the ball of the foot, such as running or court sports. An injury or other type of trauma to the area may also lead to a neuroma.      Symptoms of Santos's Neuroma  If you have a Santos's neuroma, you may have one or more of these symptoms where the nerve damage is occurring:  Tingling, burning or numbness  Pain  A feeling that something is inside the ball of the foot  A feeling that there is something in the shoe or a sock is bunched up    The progression of a Santos's neuroma often follows this pattern:  The symptoms begin gradually. At first, they occur only occasionally when wearing narrow-toed shoes or performing certain aggravating activities.  The symptoms may go away temporarily by removing the shoe, massaging  the foot or avoiding aggravating shoes or activities.  Over time, the symptoms progressively worsen and may persist for several days or weeks.  The symptoms become more intense as the neuroma enlarges and the temporary changes in the nerve become permanent.      Diagnosis of Santos's Neuroma  To arrive at a diagnosis, the foot and ankle surgeon will obtain a thorough history of your symptoms and examine your foot. During the physical examination, the doctor attempts to reproduce your symptoms by manipulating your foot. Other tests or imaging studies may be performed.    The best time to see your foot and ankle surgeon is early in the development of symptoms. Early diagnosis of a Santos's neuroma greatly lessens the need for more invasive treatments and may help you avoid surgery.      Nonsurgical Treatment  In developing a treatment plan, your foot and ankle surgeon will first determine how long you have had the neuroma and will evaluate its stage of development. Treatment approaches vary according to the severity of the problem.  For mild to moderate neuromas, treatment options may include:  Padding. Padding techniques provide support for the metatarsal arch, thereby lessening the pressure on the nerve and decreasing the compression when walking.  Icing. Placing an ice pack on the affected area helps reduce swelling.  Orthotic devices. Custom orthotic devices provided by your foot and ankle surgeon provide the support needed to reduce pressure and compression on the nerve.  Activity modifications. Activities that put repetitive pressure on the neuroma should be avoided until the condition improves.  Shoe modifications. Wear shoes with a wide toe box and avoid narrow-toed shoes or shoes with high heels.  Medications. Oral nonsteroidal anti-inflammatory drugs (NSAIDs), such as ibuprofen, may be recommended to reduce pain and inflammation.  Injection therapy. Treatment may include injections of cortisone, local  anesthetics or other agents.      When Is Surgery Needed?  Surgery may be considered in patients who have not responded adequately to nonsurgical treatments. Your foot and ankle surgeon will determine the approach that is best for your condition. The length of the recovery period will vary depending on the procedure performed.  Regardless of whether you have undergone surgical or nonsurgical treatment, your surgeon will recommend long-term measures to help keep your symptoms from returning. These include appropriate footwear and modification of activities to reduce the repetitive pressure on the foot.      Why choose a foot and ankle surgeon?  Foot and ankle surgeons are the leading experts in foot and ankle care today. As doctors of podiatric medicine - also known as podiatrists, DPMs or occasionally foot and ankle doctors - they are the board-certified surgical specialists of the podiatric profession. Foot and ankle surgeons have more education and training specific to the foot and ankle than any other healthcare provider.  Foot and ankle surgeons treat all conditions affecting the foot and ankle, from the simple to the complex, in patients of all ages including Santos's neuroma. Their intensive education and training qualify foot and ankle surgeons to perform a wide range of surgeries, including any surgery that may be indicated for Santos's neuroma.

## 2022-03-16 NOTE — PROGRESS NOTES
"  [address] [facility] [address] [place] [address]  Phone: [phone]   [phone]    Patient's PCP:Primary Doctor No  Referring Provider:No ref. provider found    Subjective:      Chief Complaint: Post-op Evaluation ( Excision of neuroma 2nd intermetatarsal space left foot)      Date of Surgery: 12/30/2021  Procedure: Excision of neuroma 2nd intermetatarsal space left foot    HPI:   Artis Corey is a 41 y.o. male who returns to the clinic today for his post-operative visit. Artis Corey rates pain a 6-7/10 on a pain scale. Compliance of Care: weightbearing Cam walker boot cast. Patient has still not been able to transition into a running shoe.  patient states that for several days he had significant improvement following the steroid injection however he feels that it has worn off.    Vitals:    03/16/22 1339   Pulse: 80   Resp: 18   SpO2: 98%   Weight: (!) 144.2 kg (318 lb)   Height: 6' 6" (1.981 m)   PainSc:   7        History reviewed. No pertinent surgical history.  History reviewed. No pertinent past medical history.  History reviewed. No pertinent family history.     Social History:   Marital Status:   Alcohol History:  reports previous alcohol use.  Tobacco History:  reports that he has never smoked. He has never used smokeless tobacco.  Drug History:  reports no history of drug use.    Review of patient's allergies indicates:  No Known Allergies    Current Outpatient Medications   Medication Sig Dispense Refill    HYDROcodone-acetaminophen (NORCO)  mg per tablet Take 1 tablet by mouth every 4 (four) hours as needed for Pain. 30 tablet 0    methylPREDNISolone (MEDROL DOSEPACK) 4 mg tablet use as directed 1 each 0    ondansetron (ZOFRAN-ODT) 4 MG TbDL Take 2 tablets (8 mg total) by mouth every 8 (eight) hours as needed (nausea). 30 tablet 0     No current facility-administered medications for this visit.       Review of Systems   Constitutional: Negative.  Negative for chills, " fatigue, fever and unexpected weight change.   HENT: Negative.  Negative for hearing loss and trouble swallowing.    Eyes: Negative.  Negative for photophobia and visual disturbance.   Respiratory: Negative.  Negative for cough, shortness of breath and wheezing.    Cardiovascular: Negative.  Negative for chest pain, palpitations and leg swelling.   Gastrointestinal: Negative.  Negative for abdominal pain and nausea.   Endocrine: Negative.    Genitourinary: Negative.  Negative for dysuria and frequency.   Musculoskeletal: Positive for gait problem. Negative for arthralgias, back pain, joint swelling and myalgias.   Skin: Negative.  Negative for rash and wound.   Allergic/Immunologic: Negative.    Neurological: Positive for numbness. Negative for tremors, seizures, weakness and headaches.   Hematological: Negative.  Does not bruise/bleed easily.   Psychiatric/Behavioral: Negative.          Objective:        Post-op surgery of the left foot with surgical incision fully healed.  Patient still has moderate tenderness to the 2nd intermetatarsal space particularly plantarly.  I am able to feel firm scar tissue in the interspace.  Mild tenderness with range of motion of the digits.  Paresthesias present.  No signs of infection or dehiscence of the incision.    Imaging:  None         Assessment:       1. Neuroma    2. Scar tissue    3. Left foot pain      Plan:   Neuroma  -     Ambulatory referral/consult to Physical/Occupational Therapy; Future; Expected date: 03/23/2022    Scar tissue    Left foot pain  -     Ambulatory referral/consult to Physical/Occupational Therapy; Future; Expected date: 03/23/2022      Follow up in about 4 weeks (around 4/13/2022), or if symptoms worsen or fail to improve.    Procedures - None    I discussed with the patient that I still believe the excess scar tissue his formed is causing majority of his symptoms.  I explained that it is too soon to do an additional steroid shot in the area.  I am  going to send him to physical therapy to see if they can work on the scar tissue in the area.  Patient is still not cleared to return to work.  I do still encourage him to transition out of the cam walker boot once symptoms allow.    This note was created using Dragon voice recognition software that occasionally misinterpreted phrases or words.

## 2022-03-17 ENCOUNTER — TELEPHONE (OUTPATIENT)
Dept: PODIATRY | Facility: CLINIC | Age: 42
End: 2022-03-17
Payer: OTHER MISCELLANEOUS

## 2022-03-17 NOTE — TELEPHONE ENCOUNTER
Received call from Vivian with worker's comp regarding patient's appointment notes from March 9 and March 16th.  Requesting notes to be faxed to her.

## 2022-04-13 ENCOUNTER — OFFICE VISIT (OUTPATIENT)
Dept: PODIATRY | Facility: CLINIC | Age: 42
End: 2022-04-13
Payer: OTHER MISCELLANEOUS

## 2022-04-13 VITALS — BODY MASS INDEX: 36.45 KG/M2 | WEIGHT: 315 LBS | HEIGHT: 78 IN | RESPIRATION RATE: 16 BRPM

## 2022-04-13 DIAGNOSIS — D36.10 NEUROMA: ICD-10-CM

## 2022-04-13 DIAGNOSIS — S96.912A TEAR OF TENDON OF LEFT FOOT, INITIAL ENCOUNTER: Primary | ICD-10-CM

## 2022-04-13 DIAGNOSIS — M79.672 LEFT FOOT PAIN: ICD-10-CM

## 2022-04-13 PROCEDURE — 99213 OFFICE O/P EST LOW 20 MIN: CPT | Mod: S$GLB,,, | Performed by: PODIATRIST

## 2022-04-13 PROCEDURE — 99213 PR OFFICE/OUTPT VISIT, EST, LEVL III, 20-29 MIN: ICD-10-PCS | Mod: S$GLB,,, | Performed by: PODIATRIST

## 2022-04-13 RX ORDER — HYDROCODONE BITARTRATE AND ACETAMINOPHEN 10; 325 MG/1; MG/1
1 TABLET ORAL EVERY 4 HOURS PRN
Qty: 30 TABLET | Refills: 0 | Status: SHIPPED | OUTPATIENT
Start: 2022-04-13 | End: 2022-05-27

## 2022-04-13 NOTE — PROGRESS NOTES
"3    [address] [facility] [address] [place] [address]  Phone: [phone]   [phone]    Patient's PCP:Primary Doctor No  Referring Provider: No ref. provider found    Subjective:      Chief Complaint:: Follow-up    HPI  Artis Croey is a 41 y.o. male who presents today for follow up from sx done on 12/30/2021,  Procedure: Excision of neuroma 2nd intermetatarsal space left foot.  Presents in boot cast.  Has been doing PT.  States he was progressing well with PT until last week, Friday.  PT had him start a new exercise in the water where he had to push off of his toes.  Afterwards felt a pop immediate pain when trying to move the toes.  When lifting the big toe, can feel a radiating pain underneath the ball of his foot into the arch.  Feels like a long, tightness, shocking pain sensation.  Due to this pain, he has had to take some of the pain medication in an attempt to relieve the pain.  He is back full time in the Cam Walker boot.      Vitals:    04/13/22 1342   Resp: 16   Weight: (!) 144.2 kg (318 lb)   Height: 6' 6" (1.981 m)   PainSc:   9      Shoe Size:     Past Surgical History:   Procedure Laterality Date    FOOT NEUROMA SURGERY Left 12/30/2021     History reviewed. No pertinent past medical history.  History reviewed. No pertinent family history.     Social History:   Marital Status:   Alcohol History:  reports previous alcohol use.  Tobacco History:  reports that he has never smoked. He has never used smokeless tobacco.  Drug History:  reports no history of drug use.    Review of patient's allergies indicates:  No Known Allergies    Current Outpatient Medications   Medication Sig Dispense Refill    HYDROcodone-acetaminophen (NORCO)  mg per tablet Take 1 tablet by mouth every 4 (four) hours as needed for Pain. 30 tablet 0     No current facility-administered medications for this visit.       Review of Systems   Constitutional: Negative for chills, fatigue, fever and unexpected " weight change.   HENT: Negative for hearing loss and trouble swallowing.    Eyes: Negative for photophobia and visual disturbance.   Respiratory: Negative for cough, shortness of breath and wheezing.    Cardiovascular: Negative for chest pain, palpitations and leg swelling.   Gastrointestinal: Negative for abdominal pain and nausea.   Genitourinary: Negative for dysuria and frequency.   Musculoskeletal: Positive for arthralgias, gait problem and myalgias. Negative for back pain and joint swelling.   Skin: Negative for rash and wound.   Neurological: Negative for tremors, seizures, weakness, numbness and headaches.   Hematological: Does not bruise/bleed easily.         Objective:        Physical Exam:   Foot Exam    General  General Appearance: appears stated age and healthy   Orientation: alert and oriented to person, place, and time   Affect: appropriate   Gait: antalgic       Left Foot/Ankle      Inspection and Palpation  Ecchymosis: none  Tenderness: great toe metatarsophalangeal joint and lesser metatarsophalangeal joints   Swelling: none   Arch: normal  Hammertoes: absent  Claw toes: absent  Hallux valgus: no  Hallux limitus: no  Skin Exam: skin intact; no drainage, no ulcer and no erythema   Neurovascular  Dorsalis pedis: 2+  Posterior tibial: 2+  Capillary refill: 2+  Varicose veins: not present  Saphenous nerve sensation: normal  Tibial nerve sensation: normal  Superficial peroneal nerve sensation: normal  Deep peroneal nerve sensation: normal  Sural nerve sensation: normal    Muscle Strength  Ankle dorsiflexion: 5  Ankle plantar flexion: 5  Ankle inversion: 5  Ankle eversion: 5  Great toe extension: 5  Great toe flexion: 4+    Range of Motion    Normal left ankle ROM  Passive  1st MTP extension: pain  1st MTP flexion: pain      Tests  Anterior drawer: negative   Talar tilt: negative   PT Tinel's sign: negative  Paresthesia: positive  Comments  Pain to the 2nd intermetatarsal space is significantly improved.   No pain with side to side compression.    Physical Exam  Cardiovascular:      Pulses:           Dorsalis pedis pulses are 2+ on the left side.        Posterior tibial pulses are 2+ on the left side.   Musculoskeletal:      Left foot: No bunion.   Feet:      Left foot:      Skin integrity: No ulcer or erythema.               Left Ankle/Foot Exam     Tenderness   The patient is tender to palpation of the great toe metatarsophalangeal joint and lesser metatarsophalangeal joints.    Range of Motion   The patient has normal left ankle ROM.     Comments:  Pain to the 2nd intermetatarsal space is significantly improved.  No pain with side to side compression.      Muscle Strength   Left Lower Extremity   Ankle Dorsiflexion:  5   Plantar flexion:  5/5   FDL: 4/5  FHL: 4/5    Reflexes     Left Side  Paresthesia: present    Vascular Exam       Left Pulses  Dorsalis Pedis:      2+  Posterior Tibial:      2+             Imaging:  None           Assessment:       1. Tear of tendon of left foot, initial encounter    2. Left foot pain    3. Neuroma      Plan:   Tear of tendon of left foot, initial encounter  -     MRI Foot (Forefoot) Left Without Contrast; Future; Expected date: 04/13/2022    Left foot pain  -     MRI Foot (Forefoot) Left Without Contrast; Future; Expected date: 04/13/2022  -     HYDROcodone-acetaminophen (NORCO)  mg per tablet; Take 1 tablet by mouth every 4 (four) hours as needed for Pain.  Dispense: 30 tablet; Refill: 0    Neuroma  -     HYDROcodone-acetaminophen (NORCO)  mg per tablet; Take 1 tablet by mouth every 4 (four) hours as needed for Pain.  Dispense: 30 tablet; Refill: 0      Follow up if symptoms worsen or fail to improve.    Procedures        I discussed with the patient that his symptoms today appear to be a new injury.  I have concerns for a partial or full tendon tear.  I recommend patient continue in the cam walker boot.  I am going to order an MRI for further evaluation for any  tendon disruption.  We are also going to hold off on physical therapy pending his MRI results.    Counseling:     I provided patient education verbally regarding:   Patient diagnosis, treatment options, as well as alternatives, risks, and benefits.     I discussed conservative treatment of minor tendon tear with cast for 6 to 8 weeks, MRI, ultrasound if needed for evaluation of the rupture and possible need for surgical repair.      This note was created using Dragon voice recognition software that occasionally misinterpreted phrases or words.

## 2022-04-18 ENCOUNTER — TELEPHONE (OUTPATIENT)
Dept: PODIATRY | Facility: CLINIC | Age: 42
End: 2022-04-18
Payer: OTHER MISCELLANEOUS

## 2022-04-18 NOTE — TELEPHONE ENCOUNTER
----- Message from Kymberly Amezquita sent at 4/18/2022  2:46 PM CDT -----  Vivian with worker's comp called to see if someone could send over his most recent ov note to [phone].    Thanks  Beena

## 2022-04-20 ENCOUNTER — PATIENT MESSAGE (OUTPATIENT)
Dept: PODIATRY | Facility: CLINIC | Age: 42
End: 2022-04-20
Payer: OTHER MISCELLANEOUS

## 2022-04-20 DIAGNOSIS — M79.672 LEFT FOOT PAIN: ICD-10-CM

## 2022-04-20 DIAGNOSIS — S96.912A TEAR OF TENDON OF LEFT FOOT, INITIAL ENCOUNTER: Primary | ICD-10-CM

## 2022-04-20 DIAGNOSIS — D36.10 NEUROMA: ICD-10-CM

## 2022-04-21 ENCOUNTER — PATIENT MESSAGE (OUTPATIENT)
Dept: PODIATRY | Facility: CLINIC | Age: 42
End: 2022-04-21
Payer: OTHER MISCELLANEOUS

## 2022-04-21 RX ORDER — HYDROCODONE BITARTRATE AND ACETAMINOPHEN 10; 325 MG/1; MG/1
1 TABLET ORAL EVERY 6 HOURS PRN
Qty: 28 TABLET | Refills: 0 | Status: SHIPPED | OUTPATIENT
Start: 2022-04-21

## 2022-04-21 NOTE — TELEPHONE ENCOUNTER
Patient would like refill on pain medication. Awaiting approval from  for MRI. Prescription pended.

## 2022-05-03 ENCOUNTER — PATIENT MESSAGE (OUTPATIENT)
Dept: PODIATRY | Facility: CLINIC | Age: 42
End: 2022-05-03
Payer: OTHER MISCELLANEOUS

## 2022-05-19 ENCOUNTER — HOSPITAL ENCOUNTER (OUTPATIENT)
Dept: RADIOLOGY | Facility: HOSPITAL | Age: 42
Discharge: HOME | End: 2022-05-19
Attending: PODIATRIST
Payer: OTHER MISCELLANEOUS

## 2022-05-19 DIAGNOSIS — M79.672 LEFT FOOT PAIN: ICD-10-CM

## 2022-05-19 DIAGNOSIS — S96.912A TEAR OF TENDON OF LEFT FOOT, INITIAL ENCOUNTER: ICD-10-CM

## 2022-05-19 PROCEDURE — 73718 MRI LOWER EXTREMITY W/O DYE: CPT | Mod: TC,PO,LT

## 2022-05-20 ENCOUNTER — PATIENT MESSAGE (OUTPATIENT)
Dept: PODIATRY | Facility: CLINIC | Age: 42
End: 2022-05-20
Payer: OTHER MISCELLANEOUS

## 2022-05-23 ENCOUNTER — TELEPHONE (OUTPATIENT)
Dept: PODIATRY | Facility: CLINIC | Age: 42
End: 2022-05-23
Payer: OTHER MISCELLANEOUS

## 2022-05-27 ENCOUNTER — OFFICE VISIT (OUTPATIENT)
Dept: PODIATRY | Facility: CLINIC | Age: 42
End: 2022-05-27
Payer: OTHER MISCELLANEOUS

## 2022-05-27 VITALS — WEIGHT: 315 LBS | BODY MASS INDEX: 36.45 KG/M2 | RESPIRATION RATE: 16 BRPM | HEIGHT: 78 IN

## 2022-05-27 DIAGNOSIS — D36.10 NEUROMA: Primary | ICD-10-CM

## 2022-05-27 DIAGNOSIS — M79.672 LEFT FOOT PAIN: ICD-10-CM

## 2022-05-27 PROCEDURE — 99214 OFFICE O/P EST MOD 30 MIN: CPT | Mod: S$GLB,,, | Performed by: PODIATRIST

## 2022-05-27 PROCEDURE — 99214 PR OFFICE/OUTPT VISIT, EST, LEVL IV, 30-39 MIN: ICD-10-PCS | Mod: S$GLB,,, | Performed by: PODIATRIST

## 2022-05-27 NOTE — PROGRESS NOTES
"  [address] [facility] [address]  [place] [address]  Phone: [phone]   [phone]    Patient's PCP:Primary Doctor No  Referring Provider: No ref. provider found    Subjective:      Chief Complaint:: Results (Left foot MRI) and Follow-up (Left foot pain)    HPI  Artis Corey is a 42 y.o. male who presents today for MRI test results.  Presents in boot cast.  Still having pain, even while in boot cast.  He states no improvement in his symptoms.    Vitals:    05/27/22 1108   Resp: 16   Weight: (!) 144.2 kg (318 lb)   Height: 6' 6" (1.981 m)   PainSc:   8      Shoe Size:     Past Surgical History:   Procedure Laterality Date    FOOT NEUROMA SURGERY Left 12/30/2021     History reviewed. No pertinent past medical history.  History reviewed. No pertinent family history.     Social History:   Marital Status:   Alcohol History:  reports previous alcohol use.  Tobacco History:  reports that he has never smoked. He has never used smokeless tobacco.  Drug History:  reports no history of drug use.    Review of patient's allergies indicates:  No Known Allergies    Current Outpatient Medications   Medication Sig Dispense Refill    HYDROcodone-acetaminophen (NORCO)  mg per tablet Take 1 tablet by mouth every 6 (six) hours as needed for Pain. 28 tablet 0     No current facility-administered medications for this visit.       Review of Systems   Constitutional: Negative for chills, fatigue, fever and unexpected weight change.   HENT: Negative for hearing loss and trouble swallowing.    Eyes: Negative for photophobia and visual disturbance.   Respiratory: Negative for cough, shortness of breath and wheezing.    Cardiovascular: Negative for chest pain, palpitations and leg swelling.   Gastrointestinal: Negative for abdominal pain and nausea.   Genitourinary: Negative for dysuria and frequency.   Musculoskeletal: Positive for gait problem. Negative for arthralgias, back pain, joint swelling and myalgias. "   Skin: Negative for rash and wound.   Neurological: Negative for tremors, seizures, weakness, numbness and headaches.   Hematological: Does not bruise/bleed easily.         Objective:        Physical Exam:   Foot Exam    General  General Appearance: appears stated age and healthy   Orientation: alert and oriented to person, place, and time   Affect: appropriate   Gait: antalgic       Left Foot/Ankle      Inspection and Palpation  Ecchymosis: none  Tenderness: lesser metatarsophalangeal joints (Plantar 2nd intermetatarsal space)  Swelling: none   Arch: normal  Hammertoes: absent  Claw toes: absent  Hallux valgus: no  Hallux limitus: no  Skin Exam: skin intact; no drainage, no ulcer and no erythema   Neurovascular  Dorsalis pedis: 2+  Posterior tibial: 2+  Capillary refill: 2+  Varicose veins: not present  Saphenous nerve sensation: normal  Tibial nerve sensation: normal  Superficial peroneal nerve sensation: normal  Deep peroneal nerve sensation: normal  Sural nerve sensation: normal    Muscle Strength  Ankle dorsiflexion: 5  Ankle plantar flexion: 5  Ankle inversion: 5  Ankle eversion: 5  Great toe extension: 5  Great toe flexion: 5    Range of Motion    Normal left ankle ROM    Tests  Anterior drawer: negative   Talar tilt: negative   PT Tinel's sign: negative  Paresthesia: positive  Comments  Significant tenderness with pain on palpation of the plantar forefoot underlying the 2nd intermetatarsal space    Physical Exam  Cardiovascular:      Pulses:           Dorsalis pedis pulses are 2+ on the left side.        Posterior tibial pulses are 2+ on the left side.   Musculoskeletal:      Left foot: No bunion.   Feet:      Left foot:      Skin integrity: No ulcer or erythema.               Left Ankle/Foot Exam     Tenderness   The patient is tender to palpation of the lesser metatarsophalangeal joints.    Range of Motion   The patient has normal left ankle ROM.     Comments:  Significant tenderness with pain on  palpation of the plantar forefoot underlying the 2nd intermetatarsal space      Muscle Strength   Left Lower Extremity   Ankle Dorsiflexion:  5   Plantar flexion:  5/5     Reflexes     Left Side  Paresthesia: present    Vascular Exam       Left Pulses  Dorsalis Pedis:      2+  Posterior Tibial:      2+             Imaging: MRI Foot (Forefoot) Left Without Contrast  Narrative: EXAMINATION:  MRI FOOT (FOREFOOT) LEFT WITHOUT CONTRAST    CLINICAL HISTORY:  flexor tendon tear; Pain in left foot    TECHNIQUE:  Multisequence, multiplanar imaging through the left forefoot obtained without IV contrast    COMPARISON:  Left foot MRI 10/22/2021    FINDINGS:  Subcutaneous edema and diminished T1 signal intensity along the dorsal aspect of the foot located between the 2nd and 3rd metatarsal-phalangeal joints in this patient who is status 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    No acute fracture bone marrow signal abnormality involving the visualized left forefoot.  Minor great toe MTP degenerative changes as evidence by tiny osteophyte formation.    Lisfranc ligament is intact.  Visualized flexor and extensor tendons of the forefoot appear intact.  Intrinsic muscles of the foot demonstrate normal signal intensity.  Visualized plantar fascia is unremarkable.  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    Intact flexor and extensor tendons of the forefoot.    Minor degenerative changes of great toe MTP joint.    Electronically signed by: Frankie Hansen MD  Date:    05/19/2022  Time:    08:35               Assessment:       1. Neuroma    2. Left foot pain      Plan:   Neuroma    Left foot pain      Follow up Patient will be scheduled for outpatient surgery.    Procedures        MRI findings and images were reviewed in detail with the patient.  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  We discussed different  potential treatment options including continue conservative treatment with boot cast immobilization, alcohol sclerosing injections, and surgical intervention.  At this time patient wishes to proceed with surgical intervention for excision of the stump neuroma.    Patient was educated about the entire pre, vaibhav and post-operative time period.  They  were educated about the pro's and con's of surgery.  All conservative measures have been exhausted. Patient understands the particular risks involved which may occur in connection with the procedure proposed including pain, swelling, infection, stiffness, decreased ROM, recurrence, rejection, numbness, delayed healing, scar formation.    Patient was educated that their diagnosis may be surgically treated.  The patient's problem will probably advance and usually will not get better without surgery.  All of the pre-operative treatment plans have been exhausted or will no longer be successful at this point in time.  Patient was told of the possible outcomes and expectations of the surgical procedure.  They  will need to be followed-up post-operatively.  Today, pictures were drawn, questions answered.      We discussed the following surgical procedures:  Excision of stump neuroma 2nd intermetatarsal space left foot         Counseling:     I provided patient education verbally regarding:   Patient diagnosis, treatment options, as well as alternatives, risks, and benefits.     I counseled patient on causes and treatments for neuromas. Wearing tight or high-heeled shoes can cause a neuroma. Shoes that are too narrow or too pointed squeeze the bones in the ball of the foot. Shoes with high heels put extra pressure on the ends of the bones. When the bones are squeezed together, they pinch the nerve that runs between them. Taking off your shoes and rubbing the ball of your foot may decrease or relieve the pain. Recommend shoes with more room in the toe box. Sometimes steroid  injection is necessary to alleviate symptoms. Discussed alcohol sclerosing injections as another option for conservative treatment.       This note was created using Dragon voice recognition software that occasionally misinterpreted phrases or words.

## 2022-05-30 ENCOUNTER — PATIENT MESSAGE (OUTPATIENT)
Dept: PODIATRY | Facility: CLINIC | Age: 42
End: 2022-05-30
Payer: OTHER MISCELLANEOUS

## 2022-06-01 ENCOUNTER — TELEPHONE (OUTPATIENT)
Dept: PODIATRY | Facility: CLINIC | Age: 42
End: 2022-06-01
Payer: OTHER MISCELLANEOUS

## 2022-06-01 DIAGNOSIS — M79.672 LEFT FOOT PAIN: ICD-10-CM

## 2022-06-01 DIAGNOSIS — D36.10 NEUROMA: Primary | ICD-10-CM

## 2022-06-02 ENCOUNTER — PATIENT MESSAGE (OUTPATIENT)
Dept: PODIATRY | Facility: CLINIC | Age: 42
End: 2022-06-02
Payer: OTHER MISCELLANEOUS

## 2022-06-23 ENCOUNTER — HOSPITAL ENCOUNTER (OUTPATIENT)
Dept: PREADMISSION TESTING | Facility: HOSPITAL | Age: 42
Discharge: HOME | End: 2022-06-23
Attending: PODIATRIST
Payer: OTHER MISCELLANEOUS

## 2022-06-23 ENCOUNTER — HOSPITAL ENCOUNTER (OUTPATIENT)
Dept: RADIOLOGY | Facility: HOSPITAL | Age: 42
Discharge: HOME | End: 2022-06-23
Attending: PODIATRIST
Payer: OTHER MISCELLANEOUS

## 2022-06-23 VITALS
OXYGEN SATURATION: 98 % | BODY MASS INDEX: 36.45 KG/M2 | HEIGHT: 78 IN | TEMPERATURE: 98 F | WEIGHT: 315 LBS | SYSTOLIC BLOOD PRESSURE: 123 MMHG | DIASTOLIC BLOOD PRESSURE: 83 MMHG | RESPIRATION RATE: 18 BRPM | HEART RATE: 75 BPM

## 2022-06-23 DIAGNOSIS — M79.672 LEFT FOOT PAIN: ICD-10-CM

## 2022-06-23 DIAGNOSIS — D36.10 NEUROMA: ICD-10-CM

## 2022-06-23 LAB
ALBUMIN SERPL BCP-MCNC: 4.5 G/DL (ref 3.5–5.2)
ALP SERPL-CCNC: 37 U/L (ref 55–135)
ALT SERPL W/O P-5'-P-CCNC: 38 U/L (ref 10–44)
ANION GAP SERPL CALC-SCNC: 7 MMOL/L (ref 8–16)
AST SERPL-CCNC: 24 U/L (ref 10–40)
BASOPHILS # BLD AUTO: 0.04 K/UL (ref 0–0.2)
BASOPHILS NFR BLD: 0.8 % (ref 0–1.9)
BILIRUB SERPL-MCNC: 0.4 MG/DL (ref 0.1–1)
BUN SERPL-MCNC: 13 MG/DL (ref 6–20)
CALCIUM SERPL-MCNC: 9.5 MG/DL (ref 8.7–10.5)
CHLORIDE SERPL-SCNC: 105 MMOL/L (ref 95–110)
CO2 SERPL-SCNC: 29 MMOL/L (ref 23–29)
CREAT SERPL-MCNC: 1.3 MG/DL (ref 0.5–1.4)
DIFFERENTIAL METHOD: NORMAL
EOSINOPHIL # BLD AUTO: 0.1 K/UL (ref 0–0.5)
EOSINOPHIL NFR BLD: 2.4 % (ref 0–8)
ERYTHROCYTE [DISTWIDTH] IN BLOOD BY AUTOMATED COUNT: 12.9 % (ref 11.5–14.5)
EST. GFR  (AFRICAN AMERICAN): >60 ML/MIN/1.73 M^2
EST. GFR  (NON AFRICAN AMERICAN): >60 ML/MIN/1.73 M^2
GLUCOSE SERPL-MCNC: 88 MG/DL (ref 70–110)
HCT VFR BLD AUTO: 44 % (ref 40–54)
HGB BLD-MCNC: 14.6 G/DL (ref 14–18)
IMM GRANULOCYTES # BLD AUTO: 0.01 K/UL (ref 0–0.04)
IMM GRANULOCYTES NFR BLD AUTO: 0.2 % (ref 0–0.5)
LYMPHOCYTES # BLD AUTO: 1.5 K/UL (ref 1–4.8)
LYMPHOCYTES NFR BLD: 30.4 % (ref 18–48)
MCH RBC QN AUTO: 30 PG (ref 27–31)
MCHC RBC AUTO-ENTMCNC: 33.2 G/DL (ref 32–36)
MCV RBC AUTO: 91 FL (ref 82–98)
MONOCYTES # BLD AUTO: 0.4 K/UL (ref 0.3–1)
MONOCYTES NFR BLD: 8.1 % (ref 4–15)
NEUTROPHILS # BLD AUTO: 3 K/UL (ref 1.8–7.7)
NEUTROPHILS NFR BLD: 58.1 % (ref 38–73)
NRBC BLD-RTO: 0 /100 WBC
PLATELET # BLD AUTO: 264 K/UL (ref 150–450)
PMV BLD AUTO: 10.6 FL (ref 9.2–12.9)
POTASSIUM SERPL-SCNC: 3.7 MMOL/L (ref 3.5–5.1)
PROT SERPL-MCNC: 7.3 G/DL (ref 6–8.4)
RBC # BLD AUTO: 4.86 M/UL (ref 4.6–6.2)
SODIUM SERPL-SCNC: 141 MMOL/L (ref 136–145)
WBC # BLD AUTO: 5.07 K/UL (ref 3.9–12.7)

## 2022-06-23 PROCEDURE — 71046 X-RAY EXAM CHEST 2 VIEWS: CPT | Mod: TC

## 2022-06-23 PROCEDURE — 93005 ELECTROCARDIOGRAM TRACING: CPT | Performed by: SPECIALIST

## 2022-06-23 PROCEDURE — 93010 ELECTROCARDIOGRAM REPORT: CPT | Mod: ,,, | Performed by: SPECIALIST

## 2022-06-23 PROCEDURE — 36415 COLL VENOUS BLD VENIPUNCTURE: CPT | Performed by: PODIATRIST

## 2022-06-23 PROCEDURE — 85025 COMPLETE CBC W/AUTO DIFF WBC: CPT | Performed by: PODIATRIST

## 2022-06-23 PROCEDURE — 80053 COMPREHEN METABOLIC PANEL: CPT | Performed by: PODIATRIST

## 2022-06-23 PROCEDURE — 93010 EKG 12-LEAD: ICD-10-PCS | Mod: ,,, | Performed by: SPECIALIST

## 2022-06-23 NOTE — DISCHARGE INSTRUCTIONS
To confirm, Your doctor has instructed you that surgery is scheduled for:   Thursday, June 30, 2022    Pre-Op will call the afternoon prior to surgery between 4:00 and 6:00 PM with the final arrival time.    Wednesday, June 29, 2022    Please report to Outpatient [place] via [facility] entrance. Check in at registration desk.    Do not eat or drink anything after midnight the night before your surgery - THIS INCLUDES  WATER, GUM, MINTS AND CANDY.  YOU MAY BRUSH YOUR TEETH BUT DO NOT SWALLOW     TAKE ONLY THESE MEDICATIONS WITH A SMALL SIP OF WATER THE MORNING OF YOUR PROCEDURE  OK TO TAKE PAIN PILL MORNING OF SURGERY      PLEASE NOTE:  The surgery schedule has many variables which may affect the time of your surgery case.  Family members should be available if your surgery time changes.  Plan to be here the day of your procedure between 4-6 hours.      DO NOT TAKE THESE MEDICATIONS 5-7 DAYS PRIOR to your procedure or per your surgeon's request: ASPIRIN, ALEVE, ADVIL, IBUPROFEN,  EMILY SELTZER, BC , FISH OIL , VITAMIN E, HERBALS  (May take Tylenol)                                                        IMPORTANT INSTRUCTIONS    Shower the night before AND the morning of your procedure with a Chlorhexidine wash such as Hibiclens or Dial antibacterial soap from the neck down. Do not apply any deodorants, lotions or powders after each shower.  Do not get it on your face or in your eyes.  You may use your own shampoo and face wash. This helps your skin to be as bacteria free as possible.  DO NOT remove hair from the surgery site.  Do not shave the incision site unless you are given specific instructions to do so.    Sleep in a bed with clean sheets.  Do not sleep with a pet in the bed.   Please leave all jewelry, piercing's and valuables at home.     Make arrangements in advance for transportation home by a responsible adult.    You must make arrangements for transportation, TAXI'S, UBER'S OR LYFTS ARE NOT ALLOWED.       If you have any questions about these instructions, call Pre-Op Admit  Nursing at [phone] or the Pre-Op Day Surgery Unit at [phone].

## 2022-06-28 ENCOUNTER — PATIENT MESSAGE (OUTPATIENT)
Dept: SURGERY | Facility: HOSPITAL | Age: 42
End: 2022-06-28

## 2022-06-29 ENCOUNTER — TELEPHONE (OUTPATIENT)
Dept: PODIATRY | Facility: CLINIC | Age: 42
End: 2022-06-29
Payer: OTHER MISCELLANEOUS

## (undated) DEVICE — ADHESIVE MASTISOL VIAL 0523-48

## (undated) DEVICE — GAUZE VASELINE XEROFORM 5X9 8884433605

## (undated) DEVICE — BANDAGE ACE STERILE 4 REB3114

## (undated) DEVICE — SYRINGE 20CC SLIP TIP 20ML 302831

## (undated) DEVICE — BANDAGE ESMARK 4X9 55514

## (undated) DEVICE — SUTURE VICRYL 4-0 RB-1 27 J214H

## (undated) DEVICE — PAD BOVIE ADULT

## (undated) DEVICE — TIP BOVIE TEFLON E1450X

## (undated) DEVICE — COUNTER NEEDLE POP 1840 BLK 31142311

## (undated) DEVICE — TRAY LOWER EXTREMITY  SMHS029-05

## (undated) DEVICE — CUFF TOURNIQUET 18DUAL PRT 5921-218-235

## (undated) DEVICE — BANDAGE KERLIX   441106

## (undated) DEVICE — SOLUTION IRRI NS BOTTLE 1000ML R5200-01

## (undated) DEVICE — GLOVE BIOGEL PI ULTRA TOUCH GRAY SZ7.5

## (undated) DEVICE — SUTURE VICRYL 3-0 RB-1 27 J215H

## (undated) DEVICE — STERISTRIP 1/2 R1547